# Patient Record
Sex: FEMALE | Race: OTHER | HISPANIC OR LATINO
[De-identification: names, ages, dates, MRNs, and addresses within clinical notes are randomized per-mention and may not be internally consistent; named-entity substitution may affect disease eponyms.]

---

## 2017-01-03 ENCOUNTER — APPOINTMENT (OUTPATIENT)
Dept: INTERNAL MEDICINE | Facility: CLINIC | Age: 42
End: 2017-01-03

## 2017-01-03 VITALS
TEMPERATURE: 98.4 F | HEIGHT: 61 IN | WEIGHT: 123 LBS | DIASTOLIC BLOOD PRESSURE: 70 MMHG | BODY MASS INDEX: 23.22 KG/M2 | SYSTOLIC BLOOD PRESSURE: 122 MMHG

## 2017-01-09 ENCOUNTER — APPOINTMENT (OUTPATIENT)
Dept: SURGERY | Facility: CLINIC | Age: 42
End: 2017-01-09

## 2017-01-09 VITALS
DIASTOLIC BLOOD PRESSURE: 90 MMHG | SYSTOLIC BLOOD PRESSURE: 153 MMHG | BODY MASS INDEX: 24.15 KG/M2 | HEIGHT: 60 IN | WEIGHT: 123 LBS | RESPIRATION RATE: 18 BRPM | HEART RATE: 73 BPM | TEMPERATURE: 99.1 F

## 2017-01-19 ENCOUNTER — APPOINTMENT (OUTPATIENT)
Dept: GASTROENTEROLOGY | Facility: CLINIC | Age: 42
End: 2017-01-19

## 2017-02-07 ENCOUNTER — OUTPATIENT (OUTPATIENT)
Dept: OUTPATIENT SERVICES | Facility: HOSPITAL | Age: 42
LOS: 1 days | End: 2017-02-07
Payer: COMMERCIAL

## 2017-02-07 VITALS
DIASTOLIC BLOOD PRESSURE: 78 MMHG | OXYGEN SATURATION: 100 % | HEART RATE: 62 BPM | RESPIRATION RATE: 16 BRPM | WEIGHT: 125.66 LBS | HEIGHT: 61 IN | TEMPERATURE: 98 F | SYSTOLIC BLOOD PRESSURE: 120 MMHG

## 2017-02-07 DIAGNOSIS — Z01.818 ENCOUNTER FOR OTHER PREPROCEDURAL EXAMINATION: ICD-10-CM

## 2017-02-07 DIAGNOSIS — Z98.51 TUBAL LIGATION STATUS: Chronic | ICD-10-CM

## 2017-02-07 DIAGNOSIS — K80.20 CALCULUS OF GALLBLADDER WITHOUT CHOLECYSTITIS WITHOUT OBSTRUCTION: ICD-10-CM

## 2017-02-07 LAB
HCT VFR BLD CALC: 39.7 % — SIGNIFICANT CHANGE UP (ref 34.5–45)
HGB BLD-MCNC: 13 G/DL — SIGNIFICANT CHANGE UP (ref 11.5–15.5)
MCHC RBC-ENTMCNC: 31.5 PG — SIGNIFICANT CHANGE UP (ref 27–34)
MCHC RBC-ENTMCNC: 32.7 GM/DL — SIGNIFICANT CHANGE UP (ref 32–36)
MCV RBC AUTO: 96.1 FL — SIGNIFICANT CHANGE UP (ref 80–100)
PLATELET # BLD AUTO: 235 K/UL — SIGNIFICANT CHANGE UP (ref 150–400)
RBC # BLD: 4.13 M/UL — SIGNIFICANT CHANGE UP (ref 3.8–5.2)
RBC # FLD: 12.7 % — SIGNIFICANT CHANGE UP (ref 10.3–14.5)
WBC # BLD: 4.64 K/UL — SIGNIFICANT CHANGE UP (ref 3.8–10.5)
WBC # FLD AUTO: 4.64 K/UL — SIGNIFICANT CHANGE UP (ref 3.8–10.5)

## 2017-02-07 PROCEDURE — 80053 COMPREHEN METABOLIC PANEL: CPT

## 2017-02-07 PROCEDURE — 85027 COMPLETE CBC AUTOMATED: CPT

## 2017-02-07 PROCEDURE — G0463: CPT

## 2017-02-07 PROCEDURE — 36415 COLL VENOUS BLD VENIPUNCTURE: CPT

## 2017-02-07 NOTE — H&P PST ADULT - MALLAMPATI CLASS
Class II - visualization of the soft palate, fauces, and uvula Class III - visualization of the soft palate and the base of the uvula

## 2017-02-07 NOTE — H&P PST ADULT - HISTORY OF PRESENT ILLNESS
40 yo  female, reports GI symptoms since summer 2016, with mid-epigastric pain and RUQ discomfort, radiating to mid back, U/S revealed a 5cm gallstone. Pt. had endoscopy done recently which revealed H-pylori which she was treated for. Pt. presents to PST today for Laparoscopic Cholecystectomy on 2/15/17. Pt. denies recent fever, chills, SOB, chest pain, changes in bowel/urinary habits.

## 2017-02-07 NOTE — H&P PST ADULT - PROBLEM SELECTOR PLAN 1
Laparoscopic Cholecystectomy  Pre-op education, including Chlorhexidine soap, provided - all questions answered

## 2017-02-07 NOTE — H&P PST ADULT - NSANTHOSAYNRD_GEN_A_CORE
No. SCOTTY screening performed.  STOP BANG Legend: 0-2 = LOW Risk; 3-4 = INTERMEDIATE Risk; 5-8 = HIGH Risk

## 2017-02-08 LAB
ALBUMIN SERPL ELPH-MCNC: 4.5 G/DL — SIGNIFICANT CHANGE UP (ref 3.3–5)
ALP SERPL-CCNC: 41 U/L — SIGNIFICANT CHANGE UP (ref 40–120)
ALT FLD-CCNC: 17 U/L — SIGNIFICANT CHANGE UP (ref 10–45)
ANION GAP SERPL CALC-SCNC: 15 MMOL/L — SIGNIFICANT CHANGE UP (ref 5–17)
AST SERPL-CCNC: 19 U/L — SIGNIFICANT CHANGE UP (ref 10–40)
BILIRUB SERPL-MCNC: 0.3 MG/DL — SIGNIFICANT CHANGE UP (ref 0.2–1.2)
BUN SERPL-MCNC: 11 MG/DL — SIGNIFICANT CHANGE UP (ref 7–23)
CALCIUM SERPL-MCNC: 9.1 MG/DL — SIGNIFICANT CHANGE UP (ref 8.4–10.5)
CHLORIDE SERPL-SCNC: 103 MMOL/L — SIGNIFICANT CHANGE UP (ref 96–108)
CO2 SERPL-SCNC: 23 MMOL/L — SIGNIFICANT CHANGE UP (ref 22–31)
CREAT SERPL-MCNC: 0.55 MG/DL — SIGNIFICANT CHANGE UP (ref 0.5–1.3)
GLUCOSE SERPL-MCNC: 70 MG/DL — SIGNIFICANT CHANGE UP (ref 70–99)
POTASSIUM SERPL-MCNC: 3.7 MMOL/L — SIGNIFICANT CHANGE UP (ref 3.5–5.3)
POTASSIUM SERPL-SCNC: 3.7 MMOL/L — SIGNIFICANT CHANGE UP (ref 3.5–5.3)
PROT SERPL-MCNC: 7.2 G/DL — SIGNIFICANT CHANGE UP (ref 6–8.3)
SODIUM SERPL-SCNC: 141 MMOL/L — SIGNIFICANT CHANGE UP (ref 135–145)

## 2017-02-10 ENCOUNTER — APPOINTMENT (OUTPATIENT)
Dept: INTERNAL MEDICINE | Facility: CLINIC | Age: 42
End: 2017-02-10

## 2017-02-10 ENCOUNTER — OTHER (OUTPATIENT)
Age: 42
End: 2017-02-10

## 2017-02-13 ENCOUNTER — APPOINTMENT (OUTPATIENT)
Dept: INTERNAL MEDICINE | Facility: CLINIC | Age: 42
End: 2017-02-13

## 2017-02-14 ENCOUNTER — RESULT REVIEW (OUTPATIENT)
Age: 42
End: 2017-02-14

## 2017-02-14 LAB — UREA BREATH TEST QL: NEGATIVE

## 2017-02-15 ENCOUNTER — TRANSCRIPTION ENCOUNTER (OUTPATIENT)
Age: 42
End: 2017-02-15

## 2017-02-15 ENCOUNTER — INPATIENT (INPATIENT)
Facility: HOSPITAL | Age: 42
LOS: 0 days | Discharge: HOME | DRG: 419 | End: 2017-02-15
Attending: SURGERY | Admitting: SURGERY
Payer: COMMERCIAL

## 2017-02-15 ENCOUNTER — APPOINTMENT (OUTPATIENT)
Dept: SURGERY | Facility: HOSPITAL | Age: 42
End: 2017-02-15

## 2017-02-15 VITALS
HEART RATE: 75 BPM | SYSTOLIC BLOOD PRESSURE: 114 MMHG | DIASTOLIC BLOOD PRESSURE: 71 MMHG | RESPIRATION RATE: 16 BRPM | TEMPERATURE: 98 F | OXYGEN SATURATION: 100 %

## 2017-02-15 VITALS
OXYGEN SATURATION: 100 % | WEIGHT: 125 LBS | HEART RATE: 64 BPM | DIASTOLIC BLOOD PRESSURE: 66 MMHG | TEMPERATURE: 98 F | RESPIRATION RATE: 18 BRPM | SYSTOLIC BLOOD PRESSURE: 101 MMHG | HEIGHT: 61 IN

## 2017-02-15 DIAGNOSIS — Z98.51 TUBAL LIGATION STATUS: Chronic | ICD-10-CM

## 2017-02-15 DIAGNOSIS — K80.20 CALCULUS OF GALLBLADDER WITHOUT CHOLECYSTITIS WITHOUT OBSTRUCTION: ICD-10-CM

## 2017-02-15 DIAGNOSIS — D17.5 BENIGN LIPOMATOUS NEOPLASM OF INTRA-ABDOMINAL ORGANS: ICD-10-CM

## 2017-02-15 PROCEDURE — 88313 SPECIAL STAINS GROUP 2: CPT

## 2017-02-15 PROCEDURE — 88304 TISSUE EXAM BY PATHOLOGIST: CPT

## 2017-02-15 PROCEDURE — 88313 SPECIAL STAINS GROUP 2: CPT | Mod: 26

## 2017-02-15 PROCEDURE — 88304 TISSUE EXAM BY PATHOLOGIST: CPT | Mod: 26

## 2017-02-15 RX ORDER — HYDROMORPHONE HYDROCHLORIDE 2 MG/ML
0.25 INJECTION INTRAMUSCULAR; INTRAVENOUS; SUBCUTANEOUS
Qty: 0 | Refills: 0 | Status: DISCONTINUED | OUTPATIENT
Start: 2017-02-15 | End: 2017-02-15

## 2017-02-15 RX ORDER — SODIUM CHLORIDE 9 MG/ML
3 INJECTION INTRAMUSCULAR; INTRAVENOUS; SUBCUTANEOUS EVERY 8 HOURS
Qty: 0 | Refills: 0 | Status: DISCONTINUED | OUTPATIENT
Start: 2017-02-15 | End: 2017-02-15

## 2017-02-15 RX ORDER — HYDROMORPHONE HYDROCHLORIDE 2 MG/ML
0.5 INJECTION INTRAMUSCULAR; INTRAVENOUS; SUBCUTANEOUS
Qty: 0 | Refills: 0 | Status: DISCONTINUED | OUTPATIENT
Start: 2017-02-15 | End: 2017-02-15

## 2017-02-15 RX ORDER — ACETAMINOPHEN 500 MG
1000 TABLET ORAL ONCE
Qty: 0 | Refills: 0 | Status: COMPLETED | OUTPATIENT
Start: 2017-02-15 | End: 2017-02-15

## 2017-02-15 RX ORDER — SODIUM CHLORIDE 9 MG/ML
500 INJECTION, SOLUTION INTRAVENOUS
Qty: 0 | Refills: 0 | Status: DISCONTINUED | OUTPATIENT
Start: 2017-02-15 | End: 2017-02-15

## 2017-02-15 RX ORDER — CEFOTETAN DISODIUM 1 G
2 VIAL (EA) INJECTION ONCE
Qty: 0 | Refills: 0 | Status: DISCONTINUED | OUTPATIENT
Start: 2017-02-15 | End: 2017-02-15

## 2017-02-15 RX ORDER — KETOROLAC TROMETHAMINE 30 MG/ML
30 SYRINGE (ML) INJECTION ONCE
Qty: 0 | Refills: 0 | Status: DISCONTINUED | OUTPATIENT
Start: 2017-02-15 | End: 2017-02-15

## 2017-02-15 RX ORDER — OXYCODONE HYDROCHLORIDE 5 MG/1
5 TABLET ORAL EVERY 4 HOURS
Qty: 0 | Refills: 0 | Status: DISCONTINUED | OUTPATIENT
Start: 2017-02-15 | End: 2017-02-15

## 2017-02-15 RX ORDER — ONDANSETRON 8 MG/1
8 TABLET, FILM COATED ORAL ONCE
Qty: 0 | Refills: 0 | Status: COMPLETED | OUTPATIENT
Start: 2017-02-15 | End: 2017-02-15

## 2017-02-15 RX ORDER — INFLUENZA VIRUS VACCINE 15; 15; 15; 15 UG/.5ML; UG/.5ML; UG/.5ML; UG/.5ML
0.5 SUSPENSION INTRAMUSCULAR ONCE
Qty: 0 | Refills: 0 | Status: DISCONTINUED | OUTPATIENT
Start: 2017-02-15 | End: 2017-02-15

## 2017-02-15 RX ORDER — OMEPRAZOLE 20 MG/1
20 CAPSULE, DELAYED RELEASE ORAL TWICE DAILY
Qty: 28 | Refills: 0 | Status: DISCONTINUED | COMMUNITY
Start: 2017-01-03 | End: 2017-02-15

## 2017-02-15 RX ORDER — CLARITHROMYCIN 500 MG/1
500 TABLET, FILM COATED ORAL
Qty: 28 | Refills: 0 | Status: DISCONTINUED | COMMUNITY
Start: 2017-01-03 | End: 2017-02-15

## 2017-02-15 RX ORDER — OXYCODONE HYDROCHLORIDE 5 MG/1
1 TABLET ORAL
Qty: 24 | Refills: 0 | OUTPATIENT
Start: 2017-02-15 | End: 2017-02-19

## 2017-02-15 RX ORDER — AMOXICILLIN 500 MG/1
500 TABLET, FILM COATED ORAL
Qty: 56 | Refills: 0 | Status: DISCONTINUED | COMMUNITY
Start: 2017-01-03 | End: 2017-02-15

## 2017-02-15 RX ADMIN — SODIUM CHLORIDE 3 MILLILITER(S): 9 INJECTION INTRAMUSCULAR; INTRAVENOUS; SUBCUTANEOUS at 07:32

## 2017-02-15 RX ADMIN — Medication 1000 MILLIGRAM(S): at 10:30

## 2017-02-15 RX ADMIN — ONDANSETRON 8 MILLIGRAM(S): 8 TABLET, FILM COATED ORAL at 12:21

## 2017-02-15 RX ADMIN — Medication 400 MILLIGRAM(S): at 10:16

## 2017-02-15 RX ADMIN — SODIUM CHLORIDE 40 MILLILITER(S): 9 INJECTION, SOLUTION INTRAVENOUS at 10:17

## 2017-02-15 NOTE — BRIEF OPERATIVE NOTE - PRE-OP DX
Calculus of gallbladder with cholecystitis without biliary obstruction, unspecified cholecystitis acuity  02/15/2017    Active  Carlos Vitale

## 2017-02-15 NOTE — ASU DISCHARGE PLAN (ADULT/PEDIATRIC). - NOTIFY
Pain not relieved by Medications/Bleeding that does not stop/Fever greater than 101/Persistent Nausea and Vomiting/Swelling that continues

## 2017-02-15 NOTE — ASU DISCHARGE PLAN (ADULT/PEDIATRIC). - MEDICATION SUMMARY - MEDICATIONS TO TAKE
I will START or STAY ON the medications listed below when I get home from the hospital:    Oxaydo 5 mg oral tablet  -- 1 tab(s) by mouth every 4 hours -for severe pain MDD:6  -- Caution federal law prohibits the transfer of this drug to any person other  than the person for whom it was prescribed.  It is very important that you take or use this exactly as directed.  Do not skip doses or discontinue unless directed by your doctor.  May cause drowsiness.  Alcohol may intensify this effect.  Use care when operating dangerous machinery.  This prescription cannot be refilled.  Using more of this medication than prescribed may cause serious breathing problems.    -- Indication: For Severe pain as needed. Take tylenol or ibuprofen for mild pain as needed

## 2017-02-23 ENCOUNTER — FORM ENCOUNTER (OUTPATIENT)
Age: 42
End: 2017-02-23

## 2017-02-24 ENCOUNTER — OUTPATIENT (OUTPATIENT)
Dept: OUTPATIENT SERVICES | Facility: HOSPITAL | Age: 42
LOS: 1 days | End: 2017-02-24
Payer: COMMERCIAL

## 2017-02-24 ENCOUNTER — APPOINTMENT (OUTPATIENT)
Dept: SURGERY | Facility: CLINIC | Age: 42
End: 2017-02-24

## 2017-02-24 ENCOUNTER — APPOINTMENT (OUTPATIENT)
Dept: ULTRASOUND IMAGING | Facility: CLINIC | Age: 42
End: 2017-02-24

## 2017-02-24 VITALS
OXYGEN SATURATION: 100 % | WEIGHT: 123 LBS | HEIGHT: 60 IN | TEMPERATURE: 98.1 F | DIASTOLIC BLOOD PRESSURE: 82 MMHG | SYSTOLIC BLOOD PRESSURE: 129 MMHG | HEART RATE: 74 BPM | BODY MASS INDEX: 24.15 KG/M2

## 2017-02-24 DIAGNOSIS — Z00.8 ENCOUNTER FOR OTHER GENERAL EXAMINATION: ICD-10-CM

## 2017-02-24 DIAGNOSIS — Z98.51 TUBAL LIGATION STATUS: Chronic | ICD-10-CM

## 2017-02-24 DIAGNOSIS — R79.89 OTHER SPECIFIED ABNORMAL FINDINGS OF BLOOD CHEMISTRY: ICD-10-CM

## 2017-02-24 LAB — SURGICAL PATHOLOGY STUDY: SIGNIFICANT CHANGE UP

## 2017-02-24 PROCEDURE — 76700 US EXAM ABDOM COMPLETE: CPT

## 2017-02-27 LAB
ALBUMIN SERPL ELPH-MCNC: 4.3 G/DL
ALP BLD-CCNC: 53 U/L
ALT SERPL-CCNC: 42 U/L
AMYLASE/CREAT SERPL: 51 U/L
ANION GAP SERPL CALC-SCNC: 14 MMOL/L
AST SERPL-CCNC: 20 U/L
BASOPHILS # BLD AUTO: 0.03 K/UL
BASOPHILS NFR BLD AUTO: 0.8 %
BILIRUB SERPL-MCNC: 0.3 MG/DL
BUN SERPL-MCNC: 12 MG/DL
CALCIUM SERPL-MCNC: 9.1 MG/DL
CHLORIDE SERPL-SCNC: 105 MMOL/L
CO2 SERPL-SCNC: 24 MMOL/L
CREAT SERPL-MCNC: 0.56 MG/DL
EOSINOPHIL # BLD AUTO: 0.15 K/UL
EOSINOPHIL NFR BLD AUTO: 3.8 %
GLUCOSE SERPL-MCNC: 77 MG/DL
HCT VFR BLD CALC: 38.4 %
HGB BLD-MCNC: 12.5 G/DL
IMM GRANULOCYTES NFR BLD AUTO: 0.3 %
LPL SERPL-CCNC: 31 U/L
LYMPHOCYTES # BLD AUTO: 1.6 K/UL
LYMPHOCYTES NFR BLD AUTO: 40.4 %
MAN DIFF?: NORMAL
MCHC RBC-ENTMCNC: 31.6 PG
MCHC RBC-ENTMCNC: 32.6 GM/DL
MCV RBC AUTO: 97.2 FL
MONOCYTES # BLD AUTO: 0.4 K/UL
MONOCYTES NFR BLD AUTO: 10.1 %
NEUTROPHILS # BLD AUTO: 1.77 K/UL
NEUTROPHILS NFR BLD AUTO: 44.6 %
PLATELET # BLD AUTO: 232 K/UL
POTASSIUM SERPL-SCNC: 4.1 MMOL/L
PROT SERPL-MCNC: 6.7 G/DL
RBC # BLD: 3.95 M/UL
RBC # FLD: 12.6 %
SODIUM SERPL-SCNC: 143 MMOL/L
WBC # FLD AUTO: 3.96 K/UL

## 2017-02-28 ENCOUNTER — APPOINTMENT (OUTPATIENT)
Dept: INTERNAL MEDICINE | Facility: CLINIC | Age: 42
End: 2017-02-28

## 2017-02-28 VITALS
WEIGHT: 124 LBS | DIASTOLIC BLOOD PRESSURE: 64 MMHG | BODY MASS INDEX: 24.35 KG/M2 | HEIGHT: 60 IN | HEART RATE: 76 BPM | SYSTOLIC BLOOD PRESSURE: 120 MMHG

## 2017-02-28 DIAGNOSIS — M62.830 MUSCLE SPASM OF BACK: ICD-10-CM

## 2017-03-27 ENCOUNTER — APPOINTMENT (OUTPATIENT)
Dept: SURGERY | Facility: CLINIC | Age: 42
End: 2017-03-27

## 2017-03-27 VITALS
OXYGEN SATURATION: 100 % | TEMPERATURE: 98.2 F | RESPIRATION RATE: 16 BRPM | HEART RATE: 91 BPM | DIASTOLIC BLOOD PRESSURE: 89 MMHG | SYSTOLIC BLOOD PRESSURE: 132 MMHG

## 2017-03-27 DIAGNOSIS — Z90.49 ACQUIRED ABSENCE OF OTHER SPECIFIED PARTS OF DIGESTIVE TRACT: ICD-10-CM

## 2017-03-27 DIAGNOSIS — R10.13 EPIGASTRIC PAIN: ICD-10-CM

## 2017-03-27 RX ORDER — OMEPRAZOLE 40 MG/1
40 CAPSULE, DELAYED RELEASE ORAL TWICE DAILY
Qty: 60 | Refills: 0 | Status: DISCONTINUED | COMMUNITY
Start: 2017-02-24 | End: 2017-03-27

## 2017-03-27 RX ORDER — CYCLOBENZAPRINE HYDROCHLORIDE 5 MG/1
5 TABLET, FILM COATED ORAL
Qty: 14 | Refills: 0 | Status: DISCONTINUED | COMMUNITY
Start: 2017-02-28 | End: 2017-03-27

## 2017-04-17 ENCOUNTER — APPOINTMENT (OUTPATIENT)
Dept: MRI IMAGING | Facility: CLINIC | Age: 42
End: 2017-04-17

## 2017-04-24 ENCOUNTER — APPOINTMENT (OUTPATIENT)
Dept: SURGERY | Facility: CLINIC | Age: 42
End: 2017-04-24

## 2017-07-06 ENCOUNTER — APPOINTMENT (OUTPATIENT)
Dept: INTERNAL MEDICINE | Facility: CLINIC | Age: 42
End: 2017-07-06

## 2017-07-06 VITALS
SYSTOLIC BLOOD PRESSURE: 118 MMHG | HEART RATE: 62 BPM | OXYGEN SATURATION: 97 % | DIASTOLIC BLOOD PRESSURE: 78 MMHG | TEMPERATURE: 97.7 F | WEIGHT: 130 LBS | HEIGHT: 60 IN | BODY MASS INDEX: 25.52 KG/M2

## 2017-07-06 DIAGNOSIS — R42 DIZZINESS AND GIDDINESS: ICD-10-CM

## 2017-07-09 LAB
ANION GAP SERPL CALC-SCNC: 13 MMOL/L
BUN SERPL-MCNC: 14 MG/DL
CALCIUM SERPL-MCNC: 8.7 MG/DL
CHLORIDE SERPL-SCNC: 105 MMOL/L
CO2 SERPL-SCNC: 25 MMOL/L
CREAT SERPL-MCNC: 0.71 MG/DL
GLUCOSE SERPL-MCNC: 74 MG/DL
HCT VFR BLD CALC: 38.7 %
HGB BLD-MCNC: 12.4 G/DL
POTASSIUM SERPL-SCNC: 4 MMOL/L
SODIUM SERPL-SCNC: 143 MMOL/L
TSH SERPL-ACNC: 1.86 UIU/ML

## 2017-09-05 ENCOUNTER — APPOINTMENT (OUTPATIENT)
Dept: INTERNAL MEDICINE | Facility: CLINIC | Age: 42
End: 2017-09-05
Payer: COMMERCIAL

## 2017-09-05 ENCOUNTER — LABORATORY RESULT (OUTPATIENT)
Age: 42
End: 2017-09-05

## 2017-09-05 VITALS
DIASTOLIC BLOOD PRESSURE: 60 MMHG | SYSTOLIC BLOOD PRESSURE: 110 MMHG | WEIGHT: 127 LBS | TEMPERATURE: 97.8 F | HEIGHT: 60 IN | BODY MASS INDEX: 24.94 KG/M2

## 2017-09-05 PROCEDURE — 36415 COLL VENOUS BLD VENIPUNCTURE: CPT

## 2017-09-05 PROCEDURE — 99213 OFFICE O/P EST LOW 20 MIN: CPT | Mod: 25

## 2017-09-05 RX ORDER — BISMUTH SUBSALICYLATE 525 MG/1
TABLET ORAL
Qty: 1 | Refills: 1 | Status: ACTIVE | COMMUNITY
Start: 2017-09-05 | End: 1900-01-01

## 2017-09-08 LAB
ALBUMIN SERPL ELPH-MCNC: 4.3 G/DL
ALP BLD-CCNC: 48 U/L
ALT SERPL-CCNC: 16 U/L
AMYLASE/CREAT SERPL: 49 U/L
ANION GAP SERPL CALC-SCNC: 14 MMOL/L
AST SERPL-CCNC: 21 U/L
BASOPHILS # BLD AUTO: 0.03 K/UL
BASOPHILS NFR BLD AUTO: 0.7 %
BILIRUB SERPL-MCNC: 0.4 MG/DL
BUN SERPL-MCNC: 12 MG/DL
CALCIUM SERPL-MCNC: 9.2 MG/DL
CHLORIDE SERPL-SCNC: 104 MMOL/L
CO2 SERPL-SCNC: 24 MMOL/L
CREAT SERPL-MCNC: 0.62 MG/DL
ENDOMYSIUM IGA SER QL: NORMAL
ENDOMYSIUM IGA TITR SER: NORMAL
EOSINOPHIL # BLD AUTO: 0.1 K/UL
EOSINOPHIL NFR BLD AUTO: 2.2 %
GLIADIN IGA SER QL: <5 UNITS
GLIADIN IGG SER QL: <5 UNITS
GLIADIN PEPTIDE IGA SER-ACNC: NEGATIVE
GLIADIN PEPTIDE IGG SER-ACNC: NEGATIVE
GLUCOSE SERPL-MCNC: 86 MG/DL
HCT VFR BLD CALC: 40.4 %
HGB BLD-MCNC: 12.9 G/DL
IGA SER QL IEP: 201 MG/DL
IMM GRANULOCYTES NFR BLD AUTO: 0.2 %
LPL SERPL-CCNC: 22 U/L
LYMPHOCYTES # BLD AUTO: 1.74 K/UL
LYMPHOCYTES NFR BLD AUTO: 38.5 %
MAN DIFF?: NORMAL
MCHC RBC-ENTMCNC: 31 PG
MCHC RBC-ENTMCNC: 31.9 GM/DL
MCV RBC AUTO: 97.1 FL
MONOCYTES # BLD AUTO: 0.45 K/UL
MONOCYTES NFR BLD AUTO: 10 %
NEUTROPHILS # BLD AUTO: 2.19 K/UL
NEUTROPHILS NFR BLD AUTO: 48.4 %
PLATELET # BLD AUTO: 234 K/UL
POTASSIUM SERPL-SCNC: 4.2 MMOL/L
PROT SERPL-MCNC: 7.9 G/DL
RBC # BLD: 4.16 M/UL
RBC # FLD: 13.2 %
SODIUM SERPL-SCNC: 142 MMOL/L
T4 FREE SERPL-MCNC: 1.3 NG/DL
TSH SERPL-ACNC: 1.61 UIU/ML
TTG IGA SER IA-ACNC: <5 UNITS
TTG IGA SER-ACNC: NEGATIVE
TTG IGG SER IA-ACNC: <5 UNITS
TTG IGG SER IA-ACNC: NEGATIVE
WBC # FLD AUTO: 4.52 K/UL

## 2017-10-05 ENCOUNTER — APPOINTMENT (OUTPATIENT)
Dept: INTERNAL MEDICINE | Facility: CLINIC | Age: 42
End: 2017-10-05
Payer: COMMERCIAL

## 2017-10-05 VITALS
BODY MASS INDEX: 24.74 KG/M2 | HEIGHT: 60 IN | TEMPERATURE: 97.7 F | DIASTOLIC BLOOD PRESSURE: 60 MMHG | SYSTOLIC BLOOD PRESSURE: 90 MMHG | WEIGHT: 126 LBS

## 2017-10-05 PROCEDURE — 99213 OFFICE O/P EST LOW 20 MIN: CPT | Mod: 25

## 2017-10-05 PROCEDURE — 90688 IIV4 VACCINE SPLT 0.5 ML IM: CPT

## 2017-10-05 PROCEDURE — G0008: CPT

## 2017-10-09 ENCOUNTER — APPOINTMENT (OUTPATIENT)
Dept: INTERNAL MEDICINE | Facility: CLINIC | Age: 42
End: 2017-10-09
Payer: COMMERCIAL

## 2017-10-09 PROCEDURE — 91065 BREATH HYDROGEN/METHANE TEST: CPT

## 2017-11-21 ENCOUNTER — APPOINTMENT (OUTPATIENT)
Dept: INTERNAL MEDICINE | Facility: CLINIC | Age: 42
End: 2017-11-21
Payer: COMMERCIAL

## 2017-11-21 VITALS
DIASTOLIC BLOOD PRESSURE: 70 MMHG | WEIGHT: 124 LBS | HEART RATE: 65 BPM | OXYGEN SATURATION: 98 % | SYSTOLIC BLOOD PRESSURE: 100 MMHG | BODY MASS INDEX: 24.22 KG/M2

## 2017-11-21 PROCEDURE — 99396 PREV VISIT EST AGE 40-64: CPT

## 2017-12-21 ENCOUNTER — APPOINTMENT (OUTPATIENT)
Dept: INTERNAL MEDICINE | Facility: CLINIC | Age: 42
End: 2017-12-21

## 2018-12-31 ENCOUNTER — APPOINTMENT (OUTPATIENT)
Dept: INTERNAL MEDICINE | Facility: CLINIC | Age: 43
End: 2018-12-31
Payer: COMMERCIAL

## 2018-12-31 VITALS
DIASTOLIC BLOOD PRESSURE: 64 MMHG | BODY MASS INDEX: 24.54 KG/M2 | HEIGHT: 60 IN | SYSTOLIC BLOOD PRESSURE: 102 MMHG | WEIGHT: 125 LBS | TEMPERATURE: 98.7 F

## 2018-12-31 PROBLEM — K80.20 CALCULUS OF GALLBLADDER WITHOUT CHOLECYSTITIS WITHOUT OBSTRUCTION: Chronic | Status: ACTIVE | Noted: 2017-02-07

## 2018-12-31 PROCEDURE — 99214 OFFICE O/P EST MOD 30 MIN: CPT

## 2018-12-31 RX ORDER — TOBRAMYCIN AND DEXAMETHASONE 3; 1 MG/ML; MG/ML
0.3-0.1 SUSPENSION/ DROPS OPHTHALMIC
Qty: 5 | Refills: 0 | Status: DISCONTINUED | COMMUNITY
Start: 2018-10-09

## 2018-12-31 NOTE — REVIEW OF SYSTEMS
[Fever] : no fever [Earache] : earache [Nasal Discharge] : no nasal discharge [Sore Throat] : no sore throat [Negative] : Eyes

## 2018-12-31 NOTE — PHYSICAL EXAM
[No Acute Distress] : no acute distress [Well Nourished] : well nourished [Well Developed] : well developed [Well-Appearing] : well-appearing [Normal Sclera/Conjunctiva] : normal sclera/conjunctiva [EOMI] : extraocular movements intact [Normal Outer Ear/Nose] : the outer ears and nose were normal in appearance [Normal Oropharynx] : the oropharynx was normal [Normal TMs] : both tympanic membranes were normal [Supple] : supple [de-identified] : mild [pain around the tragus anteriorly and posteriorly.  Pain reaches to the angle of the jaw on the right.

## 2018-12-31 NOTE — HISTORY OF PRESENT ILLNESS
[FreeTextEntry8] : 44 yo female here with c/o 3 days of stuffiness, 1 day of right sharp ear pain, like poking a knife in it.  No ear discharge that she has noticed.  Her little one has a cold.  Did not take anything for this pain.  Feels swollen glands, especially the back of the neck.  The pain is an 8/10 when it occurs but it is intermittent in nature.  Opening and closing her mouth makes the pain worse.  When asked about grinding she said her dentist asked her the same question.

## 2018-12-31 NOTE — ASSESSMENT
[FreeTextEntry1] : 43-year-old female with 1 day of intermittent, sharp right TMJ pain likely secondary to grinding as her dentist has also noted some changes that go along with this.  Lower on the differential is mastoiditis or mastoid inflammation.  There is no evidence of any ear infection on the right nor is there any tender lymphadenopathy.  Her posterior pharynx was unremarkable.  Her pain extends from the front of the ear down inferiorly and wrapping around to the back in the TMJ location.  Recommend warm soaks, NSAIDs.  If worsens, will call and will reevaluate signs and symptoms at that time.

## 2019-01-08 ENCOUNTER — APPOINTMENT (OUTPATIENT)
Dept: INTERNAL MEDICINE | Facility: CLINIC | Age: 44
End: 2019-01-08
Payer: COMMERCIAL

## 2019-01-08 VITALS
TEMPERATURE: 96.8 F | SYSTOLIC BLOOD PRESSURE: 110 MMHG | BODY MASS INDEX: 23.6 KG/M2 | WEIGHT: 125 LBS | DIASTOLIC BLOOD PRESSURE: 60 MMHG | HEIGHT: 61 IN

## 2019-01-08 PROCEDURE — 99214 OFFICE O/P EST MOD 30 MIN: CPT

## 2019-01-21 ENCOUNTER — APPOINTMENT (OUTPATIENT)
Dept: INTERNAL MEDICINE | Facility: CLINIC | Age: 44
End: 2019-01-21

## 2019-01-30 ENCOUNTER — APPOINTMENT (OUTPATIENT)
Dept: INTERNAL MEDICINE | Facility: CLINIC | Age: 44
End: 2019-01-30
Payer: COMMERCIAL

## 2019-01-30 VITALS
HEART RATE: 67 BPM | SYSTOLIC BLOOD PRESSURE: 96 MMHG | WEIGHT: 125 LBS | BODY MASS INDEX: 24.22 KG/M2 | DIASTOLIC BLOOD PRESSURE: 60 MMHG | OXYGEN SATURATION: 97 % | HEIGHT: 60.25 IN

## 2019-01-30 DIAGNOSIS — R10.13 EPIGASTRIC PAIN: ICD-10-CM

## 2019-01-30 DIAGNOSIS — K12.1 OTHER FORMS OF STOMATITIS: ICD-10-CM

## 2019-01-30 DIAGNOSIS — R76.11 NONSPECIFIC REACTION TO TUBERCULIN SKIN TEST W/OUT ACTIVE TUBERCULOSIS: ICD-10-CM

## 2019-01-30 DIAGNOSIS — M26.621 ARTHRALGIA OF RIGHT TEMPOROMANDIBULAR JOINT: ICD-10-CM

## 2019-01-30 PROCEDURE — 99396 PREV VISIT EST AGE 40-64: CPT

## 2019-01-30 PROCEDURE — G0442 ANNUAL ALCOHOL SCREEN 15 MIN: CPT

## 2019-01-30 PROCEDURE — G0444 DEPRESSION SCREEN ANNUAL: CPT

## 2019-01-30 RX ORDER — CHOLESTYRAMINE 4 G/9G
4 POWDER, FOR SUSPENSION ORAL
Qty: 60 | Refills: 3 | Status: DISCONTINUED | COMMUNITY
Start: 2017-10-05 | End: 2019-01-30

## 2019-01-30 NOTE — HEALTH RISK ASSESSMENT
[Good] : ~his/her~  mood as  good [] : No [No falls in past year] : Patient reported no falls in the past year [de-identified] : Occasional [Patient reported PAP Smear was normal] : Patient reported PAP Smear was normal [Change in mental status noted] : No change in mental status noted [Language] : denies difficulty with language [Behavior] : denies difficulty with behavior [Learning/Retaining New Information] : denies difficulty learning/retaining new information [Handling Complex Tasks] : denies difficulty handling complex tasks [Reasoning] : denies difficulty with reasoning [Spatial Ability and Orientation] : denies difficulty with spatial ability and orientation [Fully functional (bathing, dressing, toileting, transferring, walking, feeding)] : Fully functional (bathing, dressing, toileting, transferring, walking, feeding) [Fully functional (using the telephone, shopping, preparing meals, housekeeping, doing laundry, using] : Fully functional and needs no help or supervision to perform IADLs (using the telephone, shopping, preparing meals, housekeeping, doing laundry, using transportation, managing medications and managing finances) [Reports changes in hearing] : Reports no changes in hearing [Reports changes in vision] : Reports no changes in vision [Reports changes in dental health] : Reports no changes in dental health [MammogramDate] : 01/19 [PapSmearDate] : 04/15

## 2019-01-30 NOTE — DISCUSSION/SUMMARY
[FreeTextEntry1] : \par 1.  OCD -not an issue today no longer on meds.  She will always have to work on her tendencies but she feels that it remains controlled\par 2.  Insomnia - long standing.  She has no trouble initiating sleep at this point but rather maintaining it.  GYN feels it may be related to perimenopause at this point.\par 3.  Nipple lesion - long-standing and c/w a cyst.   She can express cheesy-smelling white discharge from it.  \par 4.  HCM - see below\par 5.  H/O latent TB - no constitutional signs or symptoms\par 6.  Epigastric discomfort and sensation of dysphasia along with bloating since her cholecystectomy - low residue diet, avoid carbonated beverages, decrease caffeine intake.  Has EGD scheduled for next month.\par 7.  Declines a flu shot today\par 8.  To try and get more information regarding the reason for her mother having a what sounds like partial colectomy.  Her mother passed away due to surgical complications.  Evidently when they stapled the bowel together it leaked.  She said they were blisters that were ready to pop.  Questionable diverticulitis as they do not sound like they would have done surgery for polyps in the setting.  She was asking because she wanted to know when to start screening for colorectal cancer.\par 9.  Labs as per plan\par 10.  Return to office in 1 year or as needed\par 7.  F/U pending above [Health Maintenance Visit] : health maintenance visit [Healthy Adult Female] : healthy adult female [Healthy Diet] : eats a healthy diet [Adequate Exercise Regimen] : has an adequate exercise regimen [Screening Current] : cervical cancer screening is current [Mammogram Every Year] : mammogram is needed every year [Screening Managed by ___] : breast cancer screening is managed by [unfilled] [Screening Not Indicated] : colorectal cancer screening is not indicated [BMD Testing Not Indicated] : bone mineral density testing is not indicated [Hemoglobin] : hemoglobin [Glucose] : glucose [Lipid Profile] : lipid profile [Thyroid Function Testing] : thyroid function testing [25-Hydroxyvitamin D] : 25-hydroxyvitamin D [Immunizations Up to Date] : immunizations are up to date [Dentist] : a dentist [Weight Loss] : weight loss [Sunscreen Use] : sunscreen use [Self Skin Examination] : self skin examination [Seat Belt Use] : seat belt use [Patient] : discussed with the patient

## 2019-01-30 NOTE — PHYSICAL EXAM
[General Appearance - Alert] : alert [General Appearance - In No Acute Distress] : in no acute distress [General Appearance - Well Nourished] : well nourished [General Appearance - Well Developed] : well developed [General Appearance - Well-Appearing] : healthy appearing [Sclera] : the sclera and conjunctiva were normal [PERRL With Normal Accommodation] : pupils were equal in size, round, and reactive to light [Extraocular Movements] : extraocular movements were intact [Optic Disc Abnormality] : the optic disc were normal in size and color [Outer Ear] : the ears and nose were normal in appearance [Both Tympanic Membranes Were Examined] : both tympanic membranes were normal [Oropharynx] : the oropharynx was normal [Neck Appearance] : the appearance of the neck was normal [Neck Cervical Mass (___cm)] : no neck mass was observed [Jugular Venous Distention Increased] : there was no jugular-venous distention [Thyroid Diffuse Enlargement] : the thyroid was not enlarged [Thyroid Nodule] : there were no palpable thyroid nodules [Auscultation Breath Sounds / Voice Sounds] : lungs were clear to auscultation bilaterally [Heart Rate And Rhythm] : heart rate was normal and rhythm regular [Heart Sounds] : normal S1 and S2 [Heart Sounds Gallop] : no gallops [Murmurs] : no murmurs [Heart Sounds Pericardial Friction Rub] : no pericardial rub [Arterial Pulses Carotid] : carotid pulses were normal with no bruits [Full Pulse] : the pedal pulses are present [Edema] : there was no peripheral edema [Breast Appearance] : normal in appearance [Breast Palpation Mass] : no palpable masses [Breast Abnormal Lactation (Galactorrhea)] : no nipple discharge [FreeTextEntry1] : Small cyst left areola that is white and soft - millial in appearance.  Occasionally expressees white material.  Has had for ages.  GYN has told her there is nothing to do about it but she wishes to have it taken care of.   [Bowel Sounds] : normal bowel sounds [Abdomen Soft] : soft [Abdomen Tenderness] : non-tender [Abdomen Mass (___ Cm)] : no abdominal mass palpated [Abdomen Hernia] : no hernia was discovered [Cervical Lymph Nodes Enlarged Posterior Bilaterally] : posterior cervical [Cervical Lymph Nodes Enlarged Anterior Bilaterally] : anterior cervical [Supraclavicular Lymph Nodes Enlarged Bilaterally] : supraclavicular [Axillary Lymph Nodes Enlarged Bilaterally] : axillary [Femoral Lymph Nodes Enlarged Bilaterally] : femoral [Inguinal Lymph Nodes Enlarged Bilaterally] : inguinal [No CVA Tenderness] : no ~M costovertebral angle tenderness [No Spinal Tenderness] : no spinal tenderness [Nail Clubbing] : no clubbing  or cyanosis of the fingernails [Musculoskeletal - Swelling] : no joint swelling seen [Motor Tone] : muscle strength and tone were normal [Skin Turgor] : normal skin turgor [] : no rash [Cranial Nerves] : cranial nerves 2-12 were intact [No Focal Deficits] : no focal deficits [Oriented To Time, Place, And Person] : oriented to person, place, and time [Impaired Insight] : insight and judgment were intact [Affect] : the affect was normal

## 2019-01-30 NOTE — COUNSELING
[Healthy eating counseling provided] : healthy eating [None] : None [Needs reinforcement, provided] : Patient needs reinforcement on understanding lifestyle changes and  the steps needed to achieve self management goals and reinforcement was provided [de-identified] : Antireflux diet discussed, elevate head of bed [ - Annual Alcohol Misuse Screening] : Annual Alcohol Misuse Screening [ - Annual Depression Screening] : Annual Depression Screening

## 2019-01-30 NOTE — HISTORY OF PRESENT ILLNESS
[Health Maintenance] : health maintenance [___ Year(s) Ago] : [unfilled] year(s) ago [] :  [Occupation ___] : occupation: [unfilled] [Never Smoked Cigarettes] : has never smoked cigarettes [Occasional Use] : occasional alcohol use [Never] : has never used illicit drugs [Good] : good [Reg. Dental Visits] : ~He/She~ does not have regular dental visits [Vision Problems] : She denies vision problems [Hearing Loss] : She denies hearing loss [Healthy Diet] : She consumes a diverse and healthy diet [Regular Exercise] : She does not exercise regularly [Tobacco Use] : She does not use tobacco [Perimenopausal] : the patient is perimenopausal [Performed Within 5 Years] : lipid profile performed within the past five years [Performed Last Year] : thyroid function test performed last year [Hypertension] : no hypertension [Diabetes] : no diabetes [High LDL] : no high LDL cholesterol [Previous Breast Cancer] : no previous breast cancer [Abnormal Cervical Cytology] : no abnormal cervical cytology [Inflammatory Bowel Disease] : no inflammatory bowel disease [Osteoporosis Risk Factors] : no osteoporosis risk factors [Seat Belt] : seat belt [Bicycle Helmet] : bicycle helmet [Sunscreen] : sunscreen [Smoke Detector] : smoke detector [Carbon Monoxide Detector] : carbon monoxide detector [Chemical Abuse Screen] : chemical abuse [Depression Risk Screen] : depression symptoms [Psychiatric Risk Assessment] : psychiatric symptoms [Sexual Risk Screen] : sexual behavior [Domestic Abuse Screen] : domestic abuse [Sexual Risk Behavior] : no unsafe sexual behavior [Chemical Abuse Risk] : no chemical abuse [Domestic Violence Risk] : no domestic violence [Guns at Home] : no guns at home [Anxiety Symptoms] : anxiety symptoms [Depression Symptoms] : no depression symptoms [Up to Date] : not up to date [de-identified] : Declines a flu shot today [de-identified] : \bahman Kurtz is a 44 yo female with OCD, anxiety and insomnia who comes in today for a comprehensive visit.   \par \par She has been following with GI recently because of epigastric pain and bloating.  She complains of dysphasia like there is a knot in her throat.  She is scheduled for an endoscopy next month.  She was put on pantoprazole with improvement of her symptoms but continues to have a cup of coffee in the morning.  States this began after eating a lot of spicy food over the holidays.\par Insomnia remains an issue - if she doesn't take benadryl she awakens 2 hours later and can not get back to sleep.  No excessive drowsiness with the benadryl.

## 2019-02-07 LAB
25(OH)D3 SERPL-MCNC: 20.4 NG/ML
ALBUMIN SERPL ELPH-MCNC: 4.5 G/DL
ALP BLD-CCNC: 55 U/L
ALT SERPL-CCNC: 29 U/L
ANION GAP SERPL CALC-SCNC: 11 MMOL/L
AST SERPL-CCNC: 23 U/L
BASOPHILS # BLD AUTO: 0.03 K/UL
BASOPHILS NFR BLD AUTO: 0.8 %
BILIRUB SERPL-MCNC: 0.4 MG/DL
BUN SERPL-MCNC: 18 MG/DL
CALCIUM SERPL-MCNC: 9.1 MG/DL
CHLORIDE SERPL-SCNC: 105 MMOL/L
CHOLEST SERPL-MCNC: 127 MG/DL
CHOLEST/HDLC SERPL: 2.6 RATIO
CO2 SERPL-SCNC: 26 MMOL/L
CREAT SERPL-MCNC: 0.62 MG/DL
EOSINOPHIL # BLD AUTO: 0.08 K/UL
EOSINOPHIL NFR BLD AUTO: 2.1 %
GLUCOSE SERPL-MCNC: 77 MG/DL
HBA1C MFR BLD HPLC: 5.1 %
HCT VFR BLD CALC: 41.4 %
HDLC SERPL-MCNC: 49 MG/DL
HGB BLD-MCNC: 13.2 G/DL
IMM GRANULOCYTES NFR BLD AUTO: 0.3 %
LDLC SERPL CALC-MCNC: 59 MG/DL
LYMPHOCYTES # BLD AUTO: 1.39 K/UL
LYMPHOCYTES NFR BLD AUTO: 36.8 %
MAN DIFF?: NORMAL
MCHC RBC-ENTMCNC: 31.7 PG
MCHC RBC-ENTMCNC: 31.9 GM/DL
MCV RBC AUTO: 99.5 FL
MONOCYTES # BLD AUTO: 0.35 K/UL
MONOCYTES NFR BLD AUTO: 9.3 %
NEUTROPHILS # BLD AUTO: 1.92 K/UL
NEUTROPHILS NFR BLD AUTO: 50.7 %
PLATELET # BLD AUTO: 252 K/UL
POTASSIUM SERPL-SCNC: 4.1 MMOL/L
PROT SERPL-MCNC: 7.2 G/DL
RBC # BLD: 4.16 M/UL
RBC # FLD: 13.1 %
SODIUM SERPL-SCNC: 142 MMOL/L
T4 FREE SERPL-MCNC: 1.2 NG/DL
TRIGL SERPL-MCNC: 97 MG/DL
TSH SERPL-ACNC: 2.52 UIU/ML
WBC # FLD AUTO: 3.78 K/UL

## 2019-02-13 ENCOUNTER — TRANSCRIPTION ENCOUNTER (OUTPATIENT)
Age: 44
End: 2019-02-13

## 2019-02-21 ENCOUNTER — RESULT REVIEW (OUTPATIENT)
Age: 44
End: 2019-02-21

## 2019-03-15 ENCOUNTER — APPOINTMENT (OUTPATIENT)
Dept: GASTROENTEROLOGY | Facility: CLINIC | Age: 44
End: 2019-03-15
Payer: COMMERCIAL

## 2019-03-15 ENCOUNTER — LABORATORY RESULT (OUTPATIENT)
Age: 44
End: 2019-03-15

## 2019-03-15 ENCOUNTER — TRANSCRIPTION ENCOUNTER (OUTPATIENT)
Age: 44
End: 2019-03-15

## 2019-03-15 PROCEDURE — 81025 URINE PREGNANCY TEST: CPT

## 2019-03-15 PROCEDURE — 43239 EGD BIOPSY SINGLE/MULTIPLE: CPT

## 2019-06-04 ENCOUNTER — APPOINTMENT (OUTPATIENT)
Dept: GASTROENTEROLOGY | Facility: CLINIC | Age: 44
End: 2019-06-04
Payer: COMMERCIAL

## 2019-06-04 VITALS
BODY MASS INDEX: 24.8 KG/M2 | DIASTOLIC BLOOD PRESSURE: 66 MMHG | SYSTOLIC BLOOD PRESSURE: 128 MMHG | HEIGHT: 60.25 IN | WEIGHT: 128 LBS | TEMPERATURE: 98.4 F

## 2019-06-04 DIAGNOSIS — L29.0 PRURITUS ANI: ICD-10-CM

## 2019-06-04 DIAGNOSIS — K59.00 CONSTIPATION, UNSPECIFIED: ICD-10-CM

## 2019-06-04 PROCEDURE — 99214 OFFICE O/P EST MOD 30 MIN: CPT

## 2019-06-04 NOTE — REVIEW OF SYSTEMS
[Anxiety] : anxiety [Fever] : no fever [Eye Pain] : no eye pain [Chills] : no chills [Red Eyes] : eyes not red [Loss Of Hearing] : no hearing loss [Chest Pain] : no chest pain [Joint Swelling] : no joint swelling [Skin Lesions] : no skin lesions [Constipation] : no constipation [Muscle Weakness] : no muscle weakness [Depression] : no depression [Easy Bleeding] : no tendency for easy bleeding

## 2019-06-04 NOTE — ASSESSMENT
[FreeTextEntry1] : 1. abdominal bloating\par \par plan low fodmap diet\par         trial of ib guard\par         probiotic\par          abdominal us r/o intraabdominal pathology\par          ppi daily\par \par 2. constipation and rectal itching, given age over 40 recommend colonosopy r/o polyp,mass etc, rectal itching probable hemorrhoids\par \par Discussed risks including but not limited to bleeding,infection,drug reaction, perforation,missed lesion,benefits and alternatives of colonoscopy/egd with patient  including no\par treatment and patient consents to procedure.\par \par plan colonoscopy to be scheduled\par         miralax daily\par         anusol hc as needed

## 2019-06-04 NOTE — PHYSICAL EXAM
[General Appearance - Alert] : alert [General Appearance - Well Developed] : well developed [General Appearance - Well Nourished] : well nourished [General Appearance - In No Acute Distress] : in no acute distress [Hearing Threshold Finger Rub Not [place]] : hearing was normal [Sclera] : the sclera and conjunctiva were normal [Respiration, Rhythm And Depth] : normal respiratory rhythm and effort [Auscultation Breath Sounds / Voice Sounds] : lungs were clear to auscultation bilaterally [Heart Sounds] : normal S1 and S2 [Heart Rate And Rhythm] : heart rate was normal and rhythm regular [Abdomen Soft] : soft [Bowel Sounds] : normal bowel sounds [No Rectal Mass] : no rectal mass [Occult Blood Positive] : stool was negative for occult blood [Internal Hemorrhoid] : internal hemorrhoids [Abnormal Walk] : normal gait [FreeTextEntry1] : no stool in vault [No CVA Tenderness] : no ~M costovertebral angle tenderness [Nail Clubbing] : no clubbing  or cyanosis of the fingernails [] : no rash [Skin Color & Pigmentation] : normal skin color and pigmentation [No Focal Deficits] : no focal deficits [Skin Lesions] : no skin lesions [Oriented To Time, Place, And Person] : oriented to person, place, and time

## 2019-06-04 NOTE — HISTORY OF PRESENT ILLNESS
[FreeTextEntry1] : 44 yo female with c/o rectal itching, and change in stool, thin stool.  no brbpr, no melena. patient reports abdominal bloating, reports change in  diet no help. Patient tried probiotics without much help. Patient was on pantoprazole  helped her before.  patient reports with bm feels better. no weight loss, no brbpr, no melena.\par \par s/p egd 03/2019 intestinal metaplasia, mild gastritis.\par \par no family h/o colon  cancer or gastric cancer.

## 2019-07-11 ENCOUNTER — FORM ENCOUNTER (OUTPATIENT)
Age: 44
End: 2019-07-11

## 2019-07-12 ENCOUNTER — OUTPATIENT (OUTPATIENT)
Dept: OUTPATIENT SERVICES | Facility: HOSPITAL | Age: 44
LOS: 1 days | End: 2019-07-12
Payer: COMMERCIAL

## 2019-07-12 ENCOUNTER — APPOINTMENT (OUTPATIENT)
Dept: ULTRASOUND IMAGING | Facility: CLINIC | Age: 44
End: 2019-07-12
Payer: COMMERCIAL

## 2019-07-12 DIAGNOSIS — Z98.51 TUBAL LIGATION STATUS: Chronic | ICD-10-CM

## 2019-07-12 DIAGNOSIS — R14.0 ABDOMINAL DISTENSION (GASEOUS): ICD-10-CM

## 2019-07-12 PROCEDURE — 76705 ECHO EXAM OF ABDOMEN: CPT | Mod: 26

## 2019-07-12 PROCEDURE — 76705 ECHO EXAM OF ABDOMEN: CPT

## 2019-07-19 ENCOUNTER — APPOINTMENT (OUTPATIENT)
Dept: GASTROENTEROLOGY | Facility: CLINIC | Age: 44
End: 2019-07-19
Payer: COMMERCIAL

## 2019-07-19 ENCOUNTER — LABORATORY RESULT (OUTPATIENT)
Age: 44
End: 2019-07-19

## 2019-07-19 PROCEDURE — 45385 COLONOSCOPY W/LESION REMOVAL: CPT

## 2019-11-05 ENCOUNTER — RX RENEWAL (OUTPATIENT)
Age: 44
End: 2019-11-05

## 2019-11-05 ENCOUNTER — TRANSCRIPTION ENCOUNTER (OUTPATIENT)
Age: 44
End: 2019-11-05

## 2019-11-05 RX ORDER — PANTOPRAZOLE 40 MG/1
40 TABLET, DELAYED RELEASE ORAL
Qty: 90 | Refills: 1 | Status: ACTIVE | COMMUNITY
Start: 2019-01-08 | End: 1900-01-01

## 2019-12-05 ENCOUNTER — RX RENEWAL (OUTPATIENT)
Age: 44
End: 2019-12-05

## 2019-12-06 ENCOUNTER — RX RENEWAL (OUTPATIENT)
Age: 44
End: 2019-12-06

## 2019-12-06 DIAGNOSIS — J30.9 ALLERGIC RHINITIS, UNSPECIFIED: ICD-10-CM

## 2019-12-06 RX ORDER — AZELASTINE HYDROCHLORIDE 137 UG/1
0.1 SPRAY, METERED NASAL TWICE DAILY
Qty: 1 | Refills: 3 | Status: ACTIVE | COMMUNITY
Start: 2019-12-06 | End: 1900-01-01

## 2020-02-19 ENCOUNTER — LABORATORY RESULT (OUTPATIENT)
Age: 45
End: 2020-02-19

## 2020-02-19 ENCOUNTER — APPOINTMENT (OUTPATIENT)
Dept: INTERNAL MEDICINE | Facility: CLINIC | Age: 45
End: 2020-02-19
Payer: COMMERCIAL

## 2020-02-19 VITALS
SYSTOLIC BLOOD PRESSURE: 104 MMHG | DIASTOLIC BLOOD PRESSURE: 60 MMHG | HEART RATE: 65 BPM | BODY MASS INDEX: 24.8 KG/M2 | WEIGHT: 128 LBS | HEIGHT: 60.25 IN | OXYGEN SATURATION: 98 %

## 2020-02-19 DIAGNOSIS — K29.70 GASTRITIS, UNSPECIFIED, W/OUT BLEEDING: ICD-10-CM

## 2020-02-19 DIAGNOSIS — Z23 ENCOUNTER FOR IMMUNIZATION: ICD-10-CM

## 2020-02-19 DIAGNOSIS — D12.6 BENIGN NEOPLASM OF COLON, UNSPECIFIED: ICD-10-CM

## 2020-02-19 DIAGNOSIS — Z87.19 PERSONAL HISTORY OF OTHER DISEASES OF THE DIGESTIVE SYSTEM: ICD-10-CM

## 2020-02-19 DIAGNOSIS — B96.81 GASTRITIS, UNSPECIFIED, W/OUT BLEEDING: ICD-10-CM

## 2020-02-19 DIAGNOSIS — Z13.31 ENCOUNTER FOR SCREENING FOR DEPRESSION: ICD-10-CM

## 2020-02-19 DIAGNOSIS — Z00.00 ENCOUNTER FOR GENERAL ADULT MEDICAL EXAMINATION W/OUT ABNORMAL FINDINGS: ICD-10-CM

## 2020-02-19 PROCEDURE — 90686 IIV4 VACC NO PRSV 0.5 ML IM: CPT

## 2020-02-19 PROCEDURE — G0008: CPT

## 2020-02-19 PROCEDURE — G0444 DEPRESSION SCREEN ANNUAL: CPT | Mod: NC,59

## 2020-02-19 PROCEDURE — 99396 PREV VISIT EST AGE 40-64: CPT | Mod: 25

## 2020-02-19 RX ORDER — HYDROCORTISONE ACETATE 25 MG/1
25 SUPPOSITORY RECTAL TWICE DAILY
Qty: 56 | Refills: 1 | Status: DISCONTINUED | COMMUNITY
Start: 2019-06-04 | End: 2020-02-19

## 2020-02-19 RX ORDER — TERCONAZOLE 8 MG/G
0.8 CREAM VAGINAL
Qty: 20 | Refills: 0 | Status: DISCONTINUED | COMMUNITY
Start: 2018-09-11 | End: 2020-02-19

## 2020-02-19 NOTE — COUNSELING
[Healthy eating counseling provided] : healthy eating [None] : None [Needs reinforcement, provided] : Patient needs reinforcement on understanding lifestyle changes and  the steps needed to achieve self management goals and reinforcement was provided [Activity counseling provided] : activity

## 2020-02-19 NOTE — PHYSICAL EXAM
[General Appearance - Alert] : alert [General Appearance - Well Nourished] : well nourished [General Appearance - In No Acute Distress] : in no acute distress [General Appearance - Well Developed] : well developed [General Appearance - Well-Appearing] : healthy appearing [Sclera] : the sclera and conjunctiva were normal [PERRL With Normal Accommodation] : pupils were equal in size, round, and reactive to light [Extraocular Movements] : extraocular movements were intact [Optic Disc Abnormality] : the optic disc were normal in size and color [Outer Ear] : the ears and nose were normal in appearance [Both Tympanic Membranes Were Examined] : both tympanic membranes were normal [Oropharynx] : the oropharynx was normal [Neck Appearance] : the appearance of the neck was normal [Neck Cervical Mass (___cm)] : no neck mass was observed [Jugular Venous Distention Increased] : there was no jugular-venous distention [Thyroid Diffuse Enlargement] : the thyroid was not enlarged [Thyroid Nodule] : there were no palpable thyroid nodules [Auscultation Breath Sounds / Voice Sounds] : lungs were clear to auscultation bilaterally [Heart Rate And Rhythm] : heart rate was normal and rhythm regular [Heart Sounds] : normal S1 and S2 [Heart Sounds Gallop] : no gallops [Murmurs] : no murmurs [Heart Sounds Pericardial Friction Rub] : no pericardial rub [Arterial Pulses Carotid] : carotid pulses were normal with no bruits [Full Pulse] : the pedal pulses are present [Edema] : there was no peripheral edema [Breast Appearance] : normal in appearance [Breast Palpation Mass] : no palpable masses [Breast Abnormal Lactation (Galactorrhea)] : no nipple discharge [Bowel Sounds] : normal bowel sounds [Abdomen Soft] : soft [Abdomen Tenderness] : non-tender [Abdomen Mass (___ Cm)] : no abdominal mass palpated [Abdomen Hernia] : no hernia was discovered [Cervical Lymph Nodes Enlarged Posterior Bilaterally] : posterior cervical [Cervical Lymph Nodes Enlarged Anterior Bilaterally] : anterior cervical [Supraclavicular Lymph Nodes Enlarged Bilaterally] : supraclavicular [Axillary Lymph Nodes Enlarged Bilaterally] : axillary [Femoral Lymph Nodes Enlarged Bilaterally] : femoral [Inguinal Lymph Nodes Enlarged Bilaterally] : inguinal [No CVA Tenderness] : no ~M costovertebral angle tenderness [No Spinal Tenderness] : no spinal tenderness [Nail Clubbing] : no clubbing  or cyanosis of the fingernails [Musculoskeletal - Swelling] : no joint swelling seen [Motor Tone] : muscle strength and tone were normal [Skin Turgor] : normal skin turgor [] : no rash [Cranial Nerves] : cranial nerves 2-12 were intact [No Focal Deficits] : no focal deficits [Oriented To Time, Place, And Person] : oriented to person, place, and time [Impaired Insight] : insight and judgment were intact [Affect] : the affect was normal [FreeTextEntry1] : Small cyst left areola that is white and soft - millial in appearance.  Occasionally expressees white material.  Has had for ages.  GYN has told her there is nothing to do about it but she wishes to have it taken care of.

## 2020-02-19 NOTE — HEALTH RISK ASSESSMENT
[Good] : ~his/her~  mood as  good [No falls in past year] : Patient reported no falls in the past year [Patient reported PAP Smear was normal] : Patient reported PAP Smear was normal [Fully functional (bathing, dressing, toileting, transferring, walking, feeding)] : Fully functional (bathing, dressing, toileting, transferring, walking, feeding) [Fully functional (using the telephone, shopping, preparing meals, housekeeping, doing laundry, using] : Fully functional and needs no help or supervision to perform IADLs (using the telephone, shopping, preparing meals, housekeeping, doing laundry, using transportation, managing medications and managing finances) [] : No [0] : 2) Feeling down, depressed, or hopeless: Not at all (0) [de-identified] : Occasional [de-identified] : Exercises regularly [de-identified] : Avoids vegetables because of her stomach issues [[id number]] : 0 [Patient reported mammogram was normal] : Patient reported mammogram was normal [Change in mental status noted] : No change in mental status noted [Language] : denies difficulty with language [Behavior] : denies difficulty with behavior [Learning/Retaining New Information] : denies difficulty learning/retaining new information [Handling Complex Tasks] : denies difficulty handling complex tasks [Reasoning] : denies difficulty with reasoning [Spatial Ability and Orientation] : denies difficulty with spatial ability and orientation [None] : None [With Family] : lives with family [Employed] : employed [] :  [# Of Children ___] : has [unfilled] children [Reports changes in hearing] : Reports no changes in hearing [Reports changes in vision] : Reports no changes in vision [Reports changes in dental health] : Reports no changes in dental health [Smoke Detector] : smoke detector [Carbon Monoxide Detector] : carbon monoxide detector [Guns at Home] : no guns at home [Seat Belt] :  uses seat belt [Sunscreen] : uses sunscreen [Travel to Developing Areas] : does not  travel to developing areas [MammogramDate] : 95805 [PapSmearDate] : 12/19 [ColonoscopyDate] : 07/19 [ColonoscopyComments] : Repeat in [place] 7/21 [

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
## 2020-02-19 NOTE — DISCUSSION/SUMMARY
[Health Maintenance Visit] : health maintenance visit [Healthy Adult Female] : healthy adult female [Healthy Diet] : eats a healthy diet [Adequate Exercise Regimen] : has an adequate exercise regimen [Screening Current] : cervical cancer screening is current [Mammogram Every Year] : mammogram is needed every year [Screening Managed by ___] : breast cancer screening is managed by [unfilled] [BMD Testing Not Indicated] : bone mineral density testing is not indicated [Hemoglobin] : hemoglobin [Lipid Profile] : lipid profile [Glucose] : glucose [Thyroid Function Testing] : thyroid function testing [25-Hydroxyvitamin D] : 25-hydroxyvitamin D [Immunizations Up to Date] : immunizations are up to date [Dentist] : a dentist [Sunscreen Use] : sunscreen use [Weight Loss] : weight loss [Self Skin Examination] : self skin examination [Seat Belt Use] : seat belt use [Patient] : discussed with the patient [FreeTextEntry1] : \par 1.  OCD -not an issue today no longer on meds.  She will always have to work on her tendencies but she feels that it remains controlled\par 2.  Insomnia - long standing.  She has no trouble initiating sleep at this point but rather maintaining it.  Patient states that this is improving\par 3.  Nipple lesion - long-standing and c/w a cyst.   She can express cheesy-smelling white discharge from it.  \par 4.  HCM - see below\par 5.  H/O latent TB - no constitutional signs or symptoms\par 6.  GI issues -longstanding in nature.  His bloating, lower abdominal pain and cramps.  Feels that vegetables and dairy worsen it.  Uses Beano but has never tried simethicone.  Will give a trial of this.  Has kept a diary in the past to look for any triggers.  At this point realizes that she has to live with this.  Is also taking a probiotic\par 7.  Flu shot today\par 8.  To try and get more information regarding the reason for her mother having a what sounds like partial colectomy.  Her mother passed away due to surgical complications.  Evidently when they stapled the bowel together it leaked.  She said they were blisters that were ready to pop.  Questionable diverticulitis as they do not sound like they would have done surgery for polyps in the setting.  She was asking because she wanted to know when to start screening for colorectal cancer.\par 9.  Labs as per plan\par 10.  Return to office in 1 year or as needed\par  [Next Colonoscopy Due ___] : the next colonoscopy is due [unfilled] [Mammogram Current] : mammogram is current

## 2020-03-06 LAB
25(OH)D3 SERPL-MCNC: 22.5 NG/ML
ALBUMIN SERPL ELPH-MCNC: 4.4 G/DL
ALP BLD-CCNC: 43 U/L
ALT SERPL-CCNC: 17 U/L
ANION GAP SERPL CALC-SCNC: 11 MMOL/L
AST SERPL-CCNC: 17 U/L
BASOPHILS # BLD AUTO: 0.04 K/UL
BASOPHILS NFR BLD AUTO: 1.5 %
BILIRUB SERPL-MCNC: 0.3 MG/DL
BUN SERPL-MCNC: 17 MG/DL
CALCIUM SERPL-MCNC: 9.1 MG/DL
CHLORIDE SERPL-SCNC: 107 MMOL/L
CHOLEST SERPL-MCNC: 141 MG/DL
CHOLEST/HDLC SERPL: 2.6 RATIO
CO2 SERPL-SCNC: 24 MMOL/L
CREAT SERPL-MCNC: 0.58 MG/DL
EOSINOPHIL # BLD AUTO: 0.04 K/UL
EOSINOPHIL NFR BLD AUTO: 1.5 %
ESTIMATED AVERAGE GLUCOSE: 105 MG/DL
GLUCOSE SERPL-MCNC: 77 MG/DL
HBA1C MFR BLD HPLC: 5.3 %
HCT VFR BLD CALC: 39.8 %
HDLC SERPL-MCNC: 54 MG/DL
HGB BLD-MCNC: 12.2 G/DL
IMM GRANULOCYTES NFR BLD AUTO: 0 %
LDLC SERPL CALC-MCNC: 74 MG/DL
LYMPHOCYTES # BLD AUTO: 1.1 K/UL
LYMPHOCYTES NFR BLD AUTO: 40.4 %
MAN DIFF?: NORMAL
MCHC RBC-ENTMCNC: 30.7 GM/DL
MCHC RBC-ENTMCNC: 31.7 PG
MCV RBC AUTO: 103.4 FL
MONOCYTES # BLD AUTO: 0.27 K/UL
MONOCYTES NFR BLD AUTO: 9.9 %
NEUTROPHILS # BLD AUTO: 1.27 K/UL
NEUTROPHILS NFR BLD AUTO: 46.7 %
PLATELET # BLD AUTO: 213 K/UL
POTASSIUM SERPL-SCNC: 4.5 MMOL/L
PROT SERPL-MCNC: 6.3 G/DL
RBC # BLD: 3.85 M/UL
RBC # FLD: 12.4 %
SODIUM SERPL-SCNC: 141 MMOL/L
T4 FREE SERPL-MCNC: 1.1 NG/DL
TRIGL SERPL-MCNC: 62 MG/DL
TSH SERPL-ACNC: 1.29 UIU/ML
WBC # FLD AUTO: 2.72 K/UL

## 2020-12-01 ENCOUNTER — APPOINTMENT (OUTPATIENT)
Dept: INTERNAL MEDICINE | Facility: CLINIC | Age: 45
End: 2020-12-01
Payer: COMMERCIAL

## 2020-12-01 DIAGNOSIS — Z87.898 PERSONAL HISTORY OF OTHER SPECIFIED CONDITIONS: ICD-10-CM

## 2020-12-01 DIAGNOSIS — R63.4 ABNORMAL WEIGHT LOSS: ICD-10-CM

## 2020-12-01 PROCEDURE — 99213 OFFICE O/P EST LOW 20 MIN: CPT | Mod: 95

## 2020-12-01 NOTE — HISTORY OF PRESENT ILLNESS
[Home] : at home, [unfilled] , at the time of the visit. [Other Location: e.g. Home (Enter Location, City,State)___] : at [unfilled] [Verbal consent obtained from patient] : the patient, [unfilled] [de-identified] : 45-year-old female with a history of latent TB, tubular adenoma of the colon, anxiety and OCD who presents via telehealth today with complaints of unintentional weight loss.  Over the summer her weight was approximately 126 pounds.  During that time she was doing a lot of walking and exercising.  Since then as the weather has gotten colder she is no longer doing the same amount of exercise and she has noticed that her weight has dropped to 117 pounds.  Is not on any diet and has not changed the way that she is eating.  No complaints of night sweats but her  complains that she is hot.  She is to have insomnia but that has resolved.  In addition she denies any fevers or lymphadenopathy.

## 2020-12-01 NOTE — PHYSICAL EXAM
[No Acute Distress] : no acute distress [Well Nourished] : well nourished [Well Developed] : well developed [Well-Appearing] : well-appearing [Supple] : supple [No Respiratory Distress] : no respiratory distress  [Normal Anterior Cervical Nodes] : no anterior cervical lymphadenopathy [No Focal Deficits] : no focal deficits [Normal Affect] : the affect was normal [Normal Insight/Judgement] : insight and judgment were intact [de-identified] : No lymphadenopathy visible and upon having patient palpated her neck.

## 2020-12-01 NOTE — ASSESSMENT
[FreeTextEntry1] : 45-year-old female with complaints of unintentional weight loss.  Last labs reviewed and she did have a mild increase in her MCV and a decreased white count.  Will repeat that at this time along with a B12 and folic acid.  Will check TFTs to rule out hyperthyroidism as well as a CMP for any abnormalities.  Most likely explanation is that she is stopped vigorous exercise and has lost some muscle mass.  Orders were placed into the chart and she will go to a core lab and I will follow-up with her after I get the results.

## 2020-12-09 ENCOUNTER — APPOINTMENT (OUTPATIENT)
Dept: INTERNAL MEDICINE | Facility: CLINIC | Age: 45
End: 2020-12-09
Payer: COMMERCIAL

## 2020-12-09 DIAGNOSIS — D70.4 CYCLIC NEUTROPENIA: ICD-10-CM

## 2020-12-09 LAB
ALBUMIN SERPL ELPH-MCNC: 4.7 G/DL
ALP BLD-CCNC: 55 U/L
ALT SERPL-CCNC: 19 U/L
ANION GAP SERPL CALC-SCNC: 9 MMOL/L
AST SERPL-CCNC: 19 U/L
BASOPHILS # BLD AUTO: 0.04 K/UL
BASOPHILS NFR BLD AUTO: 1.1 %
BILIRUB SERPL-MCNC: 0.2 MG/DL
BUN SERPL-MCNC: 11 MG/DL
CALCIUM SERPL-MCNC: 9.4 MG/DL
CHLORIDE SERPL-SCNC: 103 MMOL/L
CO2 SERPL-SCNC: 28 MMOL/L
CREAT SERPL-MCNC: 0.63 MG/DL
EOSINOPHIL # BLD AUTO: 0.07 K/UL
EOSINOPHIL NFR BLD AUTO: 1.9 %
ESTIMATED AVERAGE GLUCOSE: 105 MG/DL
FOLATE SERPL-MCNC: 16.7 NG/ML
GLUCOSE SERPL-MCNC: 85 MG/DL
HBA1C MFR BLD HPLC: 5.3 %
HCT VFR BLD CALC: 38.6 %
HGB BLD-MCNC: 12.4 G/DL
IMM GRANULOCYTES NFR BLD AUTO: 0 %
LYMPHOCYTES # BLD AUTO: 1.66 K/UL
LYMPHOCYTES NFR BLD AUTO: 45.1 %
MAN DIFF?: NORMAL
MCHC RBC-ENTMCNC: 31.8 PG
MCHC RBC-ENTMCNC: 32.1 GM/DL
MCV RBC AUTO: 99 FL
MONOCYTES # BLD AUTO: 0.42 K/UL
MONOCYTES NFR BLD AUTO: 11.4 %
NEUTROPHILS # BLD AUTO: 1.49 K/UL
NEUTROPHILS NFR BLD AUTO: 40.5 %
PLATELET # BLD AUTO: 216 K/UL
POTASSIUM SERPL-SCNC: 3.6 MMOL/L
PROT SERPL-MCNC: 6.7 G/DL
RBC # BLD: 3.9 M/UL
RBC # FLD: 12.4 %
SODIUM SERPL-SCNC: 140 MMOL/L
T4 FREE SERPL-MCNC: 1.2 NG/DL
TSH SERPL-ACNC: 1.9 UIU/ML
VIT B12 SERPL-MCNC: 386 PG/ML
WBC # FLD AUTO: 3.68 K/UL

## 2020-12-09 PROCEDURE — 99441: CPT

## 2020-12-15 ENCOUNTER — APPOINTMENT (OUTPATIENT)
Dept: GASTROENTEROLOGY | Facility: CLINIC | Age: 45
End: 2020-12-15
Payer: COMMERCIAL

## 2020-12-15 DIAGNOSIS — R10.9 UNSPECIFIED ABDOMINAL PAIN: ICD-10-CM

## 2020-12-15 PROCEDURE — 99214 OFFICE O/P EST MOD 30 MIN: CPT | Mod: 95

## 2020-12-15 NOTE — REVIEW OF SYSTEMS
[As Noted in HPI] : as noted in HPI [Fever] : no fever [Chills] : no chills [Eyesight Problems] : no eyesight problems [Nosebleeds] : no nosebleeds [Chest Pain] : no chest pain [Cough] : no cough [SOB on Exertion] : no shortness of breath during exertion [Itching] : no itching [Change In A Mole] : no change in a mole [Dizziness] : no dizziness [Fainting] : no fainting [Anxiety] : no anxiety [Depression] : no depression [Muscle Weakness] : no muscle weakness [Easy Bleeding] : no tendency for easy bleeding [Easy Bruising] : no tendency for easy bruising

## 2020-12-15 NOTE — REASON FOR VISIT
[Home] : at home, [unfilled] , at the time of the visit. [Medical Office: ([facility])___] : at the medical office located in

## 2020-12-15 NOTE — PHYSICAL EXAM
[General Appearance - Alert] : alert [General Appearance - In No Acute Distress] : in no acute distress [General Appearance - Well Nourished] : well nourished [General Appearance - Well Developed] : well developed [Sclera] : the sclera and conjunctiva were normal [Hearing Threshold Finger Rub Not Stevens] : hearing was normal [Neck Cervical Mass (___cm)] : no neck mass was observed [Bowel Sounds] : normal bowel sounds [Abdomen Soft] : soft [Abdomen Tenderness] : non-tender [No CVA Tenderness] : no ~M costovertebral angle tenderness [Abnormal Walk] : normal gait [Nail Clubbing] : no clubbing  or cyanosis of the fingernails [] : no rash [Skin Lesions] : no skin lesions [Oriented To Time, Place, And Person] : oriented to person, place, and time

## 2020-12-15 NOTE — HISTORY OF PRESENT ILLNESS
[FreeTextEntry1] : 44 yo female  with c/o early satiety, dyspepsia  with bloating. rumbling stomach.no gerd. Last time used ppi one month ago. no n/v  patient reports  7lb weight loss. normal bms, no brbpr, no melena.

## 2020-12-15 NOTE — ASSESSMENT
[FreeTextEntry1] : Dyspepsia with weight loss, s/p egd  last year unrevealing, s/p colonoscopy last year polyp removed. PPI did not help her symptoms, also with c/o anxiety. stools more constipated \par \par \par plan h2 blocker bid\par        ct scan abdomen/pelvis\par           f/u after ct scan\par            recommend patient seek treatment for anxiety as well.

## 2020-12-22 NOTE — ASU DISCHARGE PLAN (ADULT/PEDIATRIC). - ACCOMPANYING ADULT'S SIGNATURE_______________________________________
Statement Selected W Plasty Text: The lesion was extirpated to the level of the fat with a #15 scalpel blade.  Given the location of the defect, shape of the defect and the proximity to free margins a W-plasty was deemed most appropriate for repair.  Using a sterile surgical marker, the appropriate transposition arms of the W-plasty were drawn incorporating the defect and placing the expected incisions within the relaxed skin tension lines where possible.    The area thus outlined was incised deep to adipose tissue with a #15 scalpel blade.  The skin margins were undermined to an appropriate distance in all directions utilizing iris scissors.  The opposing transposition arms were then transposed into place in opposite direction and anchored with interrupted buried subcutaneous sutures.

## 2020-12-30 ENCOUNTER — OUTPATIENT (OUTPATIENT)
Dept: OUTPATIENT SERVICES | Facility: HOSPITAL | Age: 45
LOS: 1 days | End: 2020-12-30
Payer: COMMERCIAL

## 2020-12-30 ENCOUNTER — RESULT REVIEW (OUTPATIENT)
Age: 45
End: 2020-12-30

## 2020-12-30 ENCOUNTER — APPOINTMENT (OUTPATIENT)
Dept: CT IMAGING | Facility: CLINIC | Age: 45
End: 2020-12-30

## 2020-12-30 DIAGNOSIS — Z98.51 TUBAL LIGATION STATUS: Chronic | ICD-10-CM

## 2020-12-30 DIAGNOSIS — Z00.8 ENCOUNTER FOR OTHER GENERAL EXAMINATION: ICD-10-CM

## 2020-12-30 PROCEDURE — 74177 CT ABD & PELVIS W/CONTRAST: CPT

## 2020-12-30 PROCEDURE — 74177 CT ABD & PELVIS W/CONTRAST: CPT | Mod: 26

## 2021-03-16 ENCOUNTER — APPOINTMENT (OUTPATIENT)
Dept: GASTROENTEROLOGY | Facility: CLINIC | Age: 46
End: 2021-03-16
Payer: COMMERCIAL

## 2021-03-16 DIAGNOSIS — R14.0 ABDOMINAL DISTENSION (GASEOUS): ICD-10-CM

## 2021-03-16 DIAGNOSIS — R19.7 DIARRHEA, UNSPECIFIED: ICD-10-CM

## 2021-03-16 PROCEDURE — 99213 OFFICE O/P EST LOW 20 MIN: CPT | Mod: 95

## 2021-03-19 PROBLEM — R19.7 DIARRHEA, UNSPECIFIED TYPE: Status: ACTIVE | Noted: 2021-03-19

## 2021-03-19 NOTE — ASSESSMENT
[FreeTextEntry1] : abdominal bloating persistent, weight stable, pt concerned about parasites has soft stool.\par \par plan low FODMAP diet\par          stool culture ordered\par           pending on results consider xifaxan

## 2021-03-19 NOTE — HISTORY OF PRESENT ILLNESS
[FreeTextEntry1] : 44 yo female with h/o weight loss, s/p egd/colonoscopy /ct scan unrevealing, currently weight stable.\par Patient with c/o persistent bloating and soft stools, no brbpr, no abdominal pain\par Pt concerned about parasitis, also with lactose intolerance. has bm daily.\par \par probiotics makes symptoms worse\par  gerd controlled on ppi

## 2021-03-19 NOTE — PHYSICAL EXAM
[General Appearance - Alert] : alert [General Appearance - In No Acute Distress] : in no acute distress [General Appearance - Well Nourished] : well nourished [General Appearance - Well Developed] : well developed [Sclera] : the sclera and conjunctiva were normal [Auscultation Breath Sounds / Voice Sounds] : lungs were clear to auscultation bilaterally [Bowel Sounds] : normal bowel sounds [Abdomen Soft] : soft [Abdomen Tenderness] : non-tender [No CVA Tenderness] : no ~M costovertebral angle tenderness [Nail Clubbing] : no clubbing  or cyanosis of the fingernails [] : no rash [Skin Lesions] : no skin lesions [No Focal Deficits] : no focal deficits [Oriented To Time, Place, And Person] : oriented to person, place, and time

## 2021-03-19 NOTE — REVIEW OF SYSTEMS
[As Noted in HPI] : as noted in HPI [Fever] : no fever [Chills] : no chills [Eyesight Problems] : no eyesight problems [Nosebleeds] : no nosebleeds [Pelvic Pain] : no pelvic pain [Dysmenorrhea] : no dysmenorrhea [Itching] : no itching [Dizziness] : no dizziness [Fainting] : no fainting [Anxiety] : no anxiety [Depression] : no depression [Muscle Weakness] : no muscle weakness [Easy Bleeding] : no tendency for easy bleeding [Easy Bruising] : no tendency for easy bruising

## 2021-03-26 LAB
C DIFF TOX GENS STL QL NAA+PROBE: NORMAL
CDIFF BY PCR: NOT DETECTED
GI PCR PANEL, STOOL: NORMAL

## 2021-03-26 RX ORDER — RIFAXIMIN 550 MG/1
550 TABLET ORAL
Qty: 40 | Refills: 0 | Status: ACTIVE | COMMUNITY
Start: 2021-03-26 | End: 1900-01-01

## 2021-09-17 ENCOUNTER — RX RENEWAL (OUTPATIENT)
Age: 46
End: 2021-09-17

## 2021-09-17 RX ORDER — FAMOTIDINE 40 MG/1
40 TABLET, FILM COATED ORAL TWICE DAILY
Qty: 60 | Refills: 0 | Status: ACTIVE | COMMUNITY
Start: 2020-12-15 | End: 1900-01-01

## 2022-06-20 NOTE — PATIENT PROFILE ADULT. - NS MD [facility] INPLANTS MED DEV
Jack Dutton can you please call patient have her schedule an appointment with me to discuss abnormal labs.  Thank you None

## 2022-09-21 NOTE — H&P PST ADULT - SPIRITUAL CULTURAL, CURRENT SITUATION, PROFILE
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  NO significant rectocele      Patient has history of PESSARY use ending 2020.  She replaced the pessary in 2020 as prolapse symptoms were bothersome.  Early : ceased pessary use.  PESSARY fitting at last visit: today demonstrated NO benefit to RINg or GELLHORN pessary    POSTMENOPAUSAL ATROPHIC VAGINITIS      N81.5 Vaginal enterocele  N95.2 Postmenopausal atrophic vaginitis  N39.41 Urge incontinence  R35.0 Frequency of micturition  N81.10 Cystocele, unspecified  N39.3 Stress incontinence (female) : Noted with POP-reduced at 100 ml volume; UDS 2022      PLAN     Post-urination Bladder volume / PVR (ml): 40  Urinalysis- straight catheter specimen, automated dipstick  --  HEMATURIA: Negative  NITRITES: Positive; evidence of bacteria but there is no inflammation  LEUKOCYTES: Negative  +------------------------------------------------------------------------+  estradiol (estrogen) vaginal cream 1 gram dose , 2 doses per week    ESTROGEN cream RX was sent to a compounding pharmacy. This was also done in the past.  eRx sent to [facility], [place], [place].  -----      Urodynamics Evaluation: Bladder function testing done in our office. This will measure bladder storage function and will measure urination function. Attention to assessing for the  cause of the urinary leakage.    PREANESTHESIA MEDICAL EXAM  SURGICAL options typically include the following 3 options as well as additional considerations listed below:  1) VAGINAL Prolapse repair- UTEROSACRAL suspension , anterior repair - midline and possible paravaginal repairs, posterior colpoperineorrhaphy,  cystoscopy  2) DAVINCI PLATFORM or LAPAROSCOPIC SACRO-COLPOPEXY , Y-MESH, polypropylene mesh, cystoscopy  3) COLPOCLEISIS / PARTIAL COLPECTOMY; CYSTOURETHROSCOPY: vagina wall closure    * EACH option often includes hysterectomy with consideration for removal of tubes and ovaries  + EACH option also includes consideration for a procedure to treat stress urinary incontinence, typically a  MID- URETHRAL SLING, retropubic    PRIOR hysterectomy is known.  --  CURRENT SURGICAL  DAVINCI PLATFORM SACRO-COLPOPEXY , Y-MESH PLAN, , CYSTOSCOPY  POSSIBLE  MID- URETHRAL SLING, retropubic       -------------------------------------------------------------------  Active complaints c/w last visit.  no pessary use in 1.5 years feels comfortable  uncontrolled urination and rectum no control with defecation  needs to control fluids and diet for proper elimination function  pt wants to discuss surgical options  interstim not helpful, completed PT not helpful  changes clothes x3-4 /week  frequency 1- 3 hours  insensible periodic urine loss  not sexually active   inconsistent with vaginal estrogen use  ======  CC/Currently:    MOTIVATED to proceed with surgical prolpase repair  Questions regarding plan and alternatives were active issue at this time.        ----------------------------------------------------------------------------------  MEDICATIONS  ( ___   SUPPLEMENTS Scanned into MEDICATIONS section of DOCUMENTS tab)    --Estradiol 0.1 MG/1 GM Cream 1 gram per Vagina, twice per week. Bravo:1 Refill: 3  --metFORMIN HCl 500 MG Tablet  --Lansoprazole Oral Cap DR 30MG  --HYDROCHLOROTHIAZIDE  --Vitamin D  --Simvastatin 20  MG  --Lisinopril 10MG         PATIENT REPORTS ALLERGIES: * NKDA    OBSTETRICS & GYNECOLOGIC HISTORY:  MENOPAUSAL? Yes  TOTAL NUMBER OF PREGNANCIES: * 6  NUMBER OF LIVING CHILDREN: * 4  NUMBER OF VAGINAL DELIVERIES: * 4  NUMBER OF C-SECTIONS: * 0  NUMBER OF MISCARRIAGES OR ABORTIONS: * 2  WEIGHT OF LARGEST BABY: * 8 LBS 14 OZ  AGE WHEN PERIODS STARTED: * 13    MEDICAL HISTORY:  HIGH CHOLESTEROL  HIGH BLOOD PRESSURE  GERD / REFLUX DISEASE  IMPAIRED VISION / WEARS GLASSES    FAMILY HISTORY:  HEART DISEASE  HIGH BLOOD PRESSURE  BREAST CANCER    SOCIAL HISTORY:  OCCASIONALLY DRINKS ALCOHOL  USES HERBAL SUPPLEMENTS * GREEN TEA    SURGICAL HISTORY:  ANTERIOR REPAIR  HYSTERECTOMY  MESH PROCEDURE  SLING PROCEDURE * FAILED AFTER 4 MONTHS    OTHER: * 1965 RHINOPLASTY     HYSTERECTOMY: 11/1998 Feb, 2001: CYSTOCELE, RECTOCELE REPAIR    IU SURGERIES:  PATIENT HAS UNDERGONE NO IU SURGERIES? Yes * NO IU SURGERIES   -    x  ADDITIONAL GYNECOLOGIC HISTORY  DATE OF LAST MAMMOGRAM: 08/2012  PRIOR HYST  x  UROGYNECOLOGY SUMMARY  Urogynecology History was reviewed for the following medical problems: Prolapse, LUH, Urge Incontinence, Anal Incontinence, Nocturia, Frequency, Incomplete Emptying, Constipation and Pain.  Positive findings are listed to the right:    Active complaints c/w last visit.  no pessary use in 1.5 years - feels comfortable  uncontrolled urination and rectum no control with defecation  needs to control fluids and diet for proper elimination function  pt wants to discuss surgical options  interstim not helpful, completed PT not helpful  changes clothes x3-4 /week  frequency 1- 3 hours  + insensible periodic urine loss    not sexually active  inconsistent with vaginal estrogen use    REVIEW OF SYSTEMS  Review of General, Eyes, ENT, CV, Resp, Mus, Skin, Neuro, Endo, Hem, and Psych for symptoms of Fever or Chills, Unwanted Weight Loss, Blurred Vision, Ear Infection, Sort Throat, Chest Pain, Palpitations, Shortness of  Breath, Coughing, Wheezing, Abdominal Pain, Nausea/Vomiting, Diarrhea, Joint Pain, Skin Rash, Dizziness, Numbness or Tingling, Excessive Thirst, Easy Bruising, Feeling Anxiety and Feeling Depressed was completed.  Positive findings are listed to the right:  NEGATIVE      Nursing Education provided     Maria Del Carmen Zimmerman, RN,        x  PHYSICAL EXAM    Vitals: Height:62, Weight:155, BP Systolic:138, BP Diastolic:74, BMI:28.35      GENERAL EXAM  GENERAL: Well Developed, Well Nourished, No Acute Distress  HEENT: Examination Within Normal Limits, No Lesions    PELVIC EXAM  EXTERNAL GENITALIA: No Abnormalities  URETHRA: Normal  BLADDER: Non Tender  VAGINA: No lesions, Mild atrophic changes, more significant at vaginal apex.      BIMANUAL/ADNEXA: Nontender, No Masses  PERINEUM: Normal  ANUS: Normal  RECTUM: No MILLIE    =====    PELVIC FLOOR NEUROMUSCULAR FUNCTION  ABLE TO RELAX       PELVIC SUPPORT  Aa: -3 Ba: 0 C: 0.5  gh: 3. pb: 4 tvl : 6.5/7  Ap: -3 Bp: -3 D: x      UVJ > 30    CST: NEG: post void    DISCUSSION ITEMS  Diagnostic differentiation of stress incontinence versus urge incontinence was discussed  ANATOMIC features of her vaginal support were described and demonstrated with diagrams.  Rationale for pessary use as a short term and/or long term option was discussed.    Nonsurgical and Surgical Treatments for LUH  Behavioral and Pharmacologic Treatments for OAB  Nonsurgical and Surgical Treatments for POP  Urodynamics for Evaluation of bladder function with assessment of urination and bladder storage control.  ROLE for sling and reassessment today for PVR discussed.  -         Urodynamics Evaluation    Date  :    07/27/2022     Patient Name:     LEANA PADILLA    IMPRESSION       LUH:    Yes   @   100       Urethral Hypermobility?:   Yes       MUCP:      Test 1:   30     Test 2:    29       CLPP:     140    DO:    No        care home:   290    ml    ----------------------------------------------  U/F:    53    ml ; voided  volume    PVR:   107    ml      P/F:    Voided:  254    ml    PVR:    118    ml    RN COMMENTS  Pt reports UUI and incomplete emptying often.  Denies LUH.Small to moderate loss on LPPs.    Pdet stable.  Verbal and written discharge instructions provided.  Pt tolerated procedure well.    EXAM      U/A DIP: WBC - Negative, Nitrate - Negative, Blood - Negative  UVJ: > 30, [place]  PERINEAL SENSATION: Normal  PROLAPSE      PROLAPSE:past introitus  PROLAPSE REDUCED FOR TESTING?: Yes  SPECULUM  PROCTOSWAB        DIAGNOSIS       N39.3 STRESS INCONTINENCE; CST POSITIVE at 100 ml; prolapse reduced.  --  NO DETRUSOR OVERACTIVITY  group home 290mL  ---  R39.19 VOIDING DYSFUNCTION  UROFLOWMETRY  INCOMPLETE EMPTYING with low volume, 163 mL, at voiding assessment.    VOIDED VOLUME (ml): 53  MAX FLOW RATE (ml/sec): 3  AVERAGE FLOW RATE (ml/sec): 1  POST-VOID RESIDUAL Volume (ml): 107  VOID: Atypical  PATTERN: Poor Flow  ------    PRESSURE/FLOW STUDY  VOIDED VOLUME (ml): 254  MAX FLOW RATE (ml/s): 19  P-DET, Maximum ( cm pH2O): 113  P-DET, at max flow (cm H2O): 52  POST VOID RESIDUAL (ml): 118  VOIDING MECHANISM: Void: Typical per patient  Normal  ------    Reviewed By:    PAYAM FLOWER MD      PVR at last OFFICE VIST:  Post-urination Bladder volume / PVR (ml): 40  -  FINDINGS c/w inhibited voids      SURGICAL DISCUSSION  Decision-making regarding pessary versus repair of vaginal support was reviewed. The role for hysterectomy and consideration of deferral hysterectomy for each of these procedures was also discussed.  We discussed the risks, benefits, goals and alternatives to surgical approaches for pelvic floor disorders  including, but not limited to, bleeding, infection, injury to adjacent structures (including but not limited  to gastrointestinal tract, urinary tract and nerves) recurrent prolapse, recurrent stress incontinence, de  tyrell OAB, pain, sexual dysfunction, voiding dysfunction, long-term catheterization and re-operation.  Post-op  restrictions and expectations reviewed.      PROLAPSE REPAIRs:  Anatomy of apical, anterior and posterior repairs was reviewed.  Anterior colporrhaphy, midline and lateral  Posterior repair and related role for Perineorrhaphy  Enterocelerepair and apical support procedures of uterosacral and sacrospinous supension for apex support.    Role for augmented repairs with mesh of biologic and polypropylene/manufactured products  via abdominal , vaginal and laparoscopic approaches.    MID-URETHRAL SLINGS: TVT, TOT, Single Incision, Mesh material for sling was compared and contrasted to mesh for POP repair and issues related to FDA mesh warnings were discussed.    USE OF MESH FOR REINFORCEMENT OF VAGINAL REPAIRS- with autologous, polypropylene, xenografts etc.    SACROCOLPOPEXY: Abdominal, Robotic, LSC, Vaginal    COLPOCLEISIS / COLPECTOMY, partial    Roles for Urethral injectables and Urethropexy and Mid-Urethral Sling was discussed.  Roles for BOTOX, INTERSTIM and Urgent-PC (Percutaneous. Tibial Nerve Stim ) were discussed.  ROLE of CYSTOURETHROSCOPY with HYDRODISTENSION; nature of findings from this evaluation were discussed.  Exam findings and relation to support procedures.    Surgical decision-making regarding colpocleisis/colpectomy versus reconstruction of vaginal support was reviewed.  Stress urinary incontinence treatment with urethral injectables, urethropexy, pubo-vaginal, and midurethral slings were discussed.  LUH treatments were compared and contrasted to the  procedures for OAB/UUI including BOTOX, INTERSTIM and Urgent-PC (Percutaneous . Tibial Nerve Stim ) .    LIMITs of prolapse repair for correction of sustained durge loss and anal control symptoms were reviewed.  It is not a reliable treatment for her UUI or anal control symptoms for this patient.  Support can provide comfort, and a sling is reliable for LUH.    RESULTS of URODYNAMICS were reviewed.    Prolapse repair may help bladder and anal  control. It is felt that at this time it is not necessarily reliable for the patient.  Further evaluation is planned.  ROLE for pessary to assess symptoms responses was discussed.    ------  SURGICAL decision-,making was reviewed.    We discussed the risks, benefits, goals and alternatives to surgical approaches for pelvic floor disorders including, but not limited to, bleeding, infection, injury to adjacent structures (including but not limited to gastrointestinal tract, urinary tract and nerves) recurrent prolapse, recurrent stress incontinence, de tyrell OAB, pain, sexual dysfunction, voiding dysfunction, long-term catheterization and re-operation. Post-op restrictions and expectations reviewed.    REPAIR: Anatomy of apical, anterior and posterior repairs was reviewed.    Anterior- role for midline and paravaginal repairs  Posterior- anatomy of repair and relation to perineal repair  Enterocele- anatomic aspects of apex descent repairs; also explained anatomy of enterocele in presence of optimal apical support.  Perineorrhaphy  Uterosacral and sacrospinous suspension for apex support  Role for augmented repairs with mesh of biologic and polypropylene/manufactured products  via abdominal , vaginal and laparoscopic approaches.  USE OF MESH FOR REINFORCEMENT OF VAGINAL REPAIRS  SACROCOLPOPEXY: Abdominal, Robotic, LSC, Vaginal  COLPOCLEISIS / COLPECTOMY, partial  MID-URETHRAL SLINGS: TVT/retropubic vs TOT, and Single incision midurethral slings were compared and contrasted  Mesh material for sling was compared and contrasted to mesh for POP repair and issues related to FDA mesh warnings were discussed.  The role of midurethral sling or other anti-incontinence procedure use even in the absence of objective stress incontinence was discussed.  Anatomic location of sling was reviewed and demonstrated on physical exam.      DIAGNOSIS  VAGINAL VAULT PROLAPSE : complete descent of apex through normal introitus/genital  hiatus  with secondary cystocele.  NO significant rectocele    STRESS INCONTINENCE: objectively noted with POP reduced w/ CST at 100 ml on URODYNAMICS 7/27/2022    OVERACTIVE BLADDER w/ URGE INCONTINENCE:  OAB meds and PT were not beneficial ;  URGENT PC PTNS done 7/2020 - 9/2020: 11 treatment sessions; no subjective benefit      POSTMENOPAUSAL ATROPHIC VAGINITIS  -------------  Patient has history of PESSARY use ending 5/2020.  She replaced the pessary in 9/2020 as prolapse symptoms were bothersome.  Early 2021: ceased pessary use.  PESSARY fitting at last visit, 7/14/22: NO benefit to RINg or GELLHORN pessary        N81.5 Vaginal enterocele  N95.2 Postmenopausal atrophic vaginitis  N39.41 Urge incontinence  R35.0 Frequency of micturition  N81.10 Cystocele, unspecified  N39.3 Stress incontinence (female) : Noted with POP-reduced at 100 ml volume; UDS 7/2022       EVALUATION & TREATMENT PLAN   EVALUATION:  Post-urination Bladder volume / PVR (ml): 40  Urinalysis- straight catheter specimen, automated dipstick : normal for all 10 parameters  +------------------------------------------------------------------------+  ONGOING:  estradiol (estrogen) vaginal cream 1 gram dose , 2 doses per week  -----  PREANESTHESIA MEDICAL EXAM upcoming    Pre-Surgical PAIN MED PLAN:  Beginning 3 days before your procedure:  take these medications by mouth:  1) Tylenol EXTRA STRENGTH: 500 mg; Take 2 tablets by mouth every 6 hours  (NO Rx needed)  2) CELEBREX 200 mg ;One dose 2 x per day  (eRx sent to your pharmacy)  3) LYRICA (pregabalin)50 mg ; one dose 2 x per day for 3 days; additional tablets are for use after surgery. (Printed, signed Rx provided)      TREATMENT PLAN, SURGICAL    OR PLAN: 9/21/2022  DAVINCI PLATFORM SACRO-COLPOPEXY , Y-MESH PLAN, , CYSTOSCOPY  MID- URETHRAL SLING, retropubic      POST OP Pain MED plan  1) TYLENOL 500 mg : 2 tablets/caps ; taken by mouth every 6 hours  2) Ibuprofen 600 mg; ONE dose taken every 6  hours  Meds are optimally dosed every 3 hours , alternating Tylenol and Ibuprofen  For pain not controlled with these meds  3) LYRICA 50 mg ; one dose 2 times a day for 7 days    4) Oxycodone 5 mg RX for 4 tablet; 1 taken up to 2 x a day as needed.  NOTE:Oxycodone may be dosed every 4-6 hours but pain management plan  is for dosing up to 2 x per day.       VISIT TIME (return patient)  25 Minutes     Maria Del Carmen Zimmerman RN,      Portions of this  note may have been created using the Dragon voice recognition system.  Errors in content may be related to improper recognition of the system.  Effort to review and correct the note has been made but irregularities may still be present.  Medication reconciliation was done but medications not prescribed by me may be inaccurate.        PAYAM FLOWER ([place]) Thursday, Sep 8, 2022, 11:30 AM       none

## 2022-10-13 PROBLEM — Z13.31 SCREENING FOR DEPRESSION: Status: RESOLVED | Noted: 2020-02-19 | Resolved: 2020-02-21

## 2023-02-07 NOTE — H&P PST ADULT - I HAVE PERSONALLY SEEN AND EXAMINED THE PATIENT. THERE HAVE NOT BEEN ANY CHANGES IN THE PATIENT'S HISTORY OR EXAM UNLESS COMMENTED BELOW
Be sure to give Barb Fabian the full 10 days of antibiotics. Encourage rest, offer increased hydration, give tylenol/ibuprofen as needed for fever/pain. Return to clinic or follow up with PCP if you worsen or fail to improve. Patient's mother verbalizes understanding and agrees with treatment plan. Statement Selected

## 2023-10-03 NOTE — REASON FOR VISIT
[Follow-Up: _____] : a [unfilled] follow-up visit [FreeTextEntry1] : bloating V-Y Plasty Text: The defect edges were debeveled with a #15 scalpel blade.  Given the location of the defect, shape of the defect and the proximity to free margins an V-Y advancement flap was deemed most appropriate.  Using a sterile surgical marker, an appropriate advancement flap was drawn incorporating the defect and placing the expected incisions within the relaxed skin tension lines where possible.    The area thus outlined was incised deep to adipose tissue with a #15 scalpel blade.  The skin margins were undermined to an appropriate distance in all directions utilizing iris scissors.

## 2023-10-12 NOTE — H&P PST ADULT - BMI (KG/M2)
Problem: Potential for Falls  Goal: Patient will remain free of falls  Description: INTERVENTIONS:  - Educate patient/family on patient safety including physical limitations  - Instruct patient to call for assistance with activity   - Consult OT/PT to assist with strengthening/mobility   - Keep Call bell within reach  - Keep bed low and locked with side rails adjusted as appropriate  - Keep care items and personal belongings within reach  - Initiate and maintain comfort rounds  - Make Fall Risk Sign visible to staff  - Apply yellow socks and bracelet for high fall risk patients  - Consider moving patient to room near nurses station  Outcome: Progressing     Problem: Prexisting or High Potential for Compromised Skin Integrity  Goal: Skin integrity is maintained or improved  Description: INTERVENTIONS:  - Identify patients at risk for skin breakdown  - Assess and monitor skin integrity  - Assess and monitor nutrition and hydration status  - Monitor labs   - Assess for incontinence   - Turn and reposition patient  - Assist with mobility/ambulation  - Relieve pressure over bony prominences  - Avoid friction and shearing  - Provide appropriate hygiene as needed including keeping skin clean and dry  - Evaluate need for skin moisturizer/barrier cream  - Collaborate with interdisciplinary team   - Patient/family teaching  - Consider wound care consult   Outcome: Progressing     Problem: PAIN - ADULT  Goal: Verbalizes/displays adequate comfort level or baseline comfort level  Description: Interventions:  - Encourage patient to monitor pain and request assistance  - Assess pain using appropriate pain scale  - Administer analgesics based on type and severity of pain and evaluate response  - Implement non-pharmacological measures as appropriate and evaluate response  - Consider cultural and social influences on pain and pain management  - Notify physician/advanced practitioner if interventions unsuccessful or patient reports new pain  Outcome: Progressing     Problem: INFECTION - ADULT  Goal: Absence or prevention of progression during hospitalization  Description: INTERVENTIONS:  - Assess and monitor for signs and symptoms of infection  - Monitor lab/diagnostic results  - Monitor all insertion sites, i.e. indwelling lines, tubes, and drains  - Monitor endotracheal if appropriate and nasal secretions for changes in amount and color  - [place] appropriate cooling/warming therapies per order  - Administer medications as ordered  - Instruct and encourage patient and family to use good hand hygiene technique  - Identify and instruct in appropriate isolation precautions for identified infection/condition  Outcome: Progressing  Goal: Absence of fever/infection during neutropenic period  Description: INTERVENTIONS:  - Monitor WBC    Outcome: Progressing     Problem: SAFETY ADULT  Goal: Patient will remain free of falls  Description: INTERVENTIONS:  - Educate patient/family on patient safety including physical limitations  - Instruct patient to call for assistance with activity   - Consult OT/PT to assist with strengthening/mobility   - Keep Call bell within reach  - Keep bed low and locked with side rails adjusted as appropriate  - Keep care items and personal belongings within reach  - Initiate and maintain comfort rounds  - Make Fall Risk Sign visible to staff  - Apply yellow socks and bracelet for high fall risk patients  - Consider moving patient to room near nurses station  Outcome: Progressing  Goal: Maintain or return to baseline ADL function  Description: INTERVENTIONS:  -  Assess patient's ability to carry out ADLs; assess patient's baseline for ADL function and identify physical deficits which impact ability to perform ADLs (bathing, care of mouth/teeth, toileting, grooming, dressing, etc.)  - Assess/evaluate cause of self-care deficits   - Assess range of motion  - Assess patient's mobility; develop plan if impaired  - Assess patient's need for assistive devices and provide as appropriate  - Encourage maximum independence but intervene and supervise when necessary  - Involve family in performance of ADLs  - Assess for home care needs following discharge   - Consider OT consult to assist with ADL evaluation and planning for discharge  - Provide patient education as appropriate  Outcome: Progressing  Goal: Maintains/Returns to pre admission functional level  Description: INTERVENTIONS:  - Perform BMAT or MOVE assessment daily.   - Set and communicate daily mobility goal to care team and patient/family/caregiver. - Collaborate with rehabilitation services on mobility goals if consulted  - Stand patient 4 times a day  - Ambulate patient 2 times a day  - Out of bed to chair 2 times a day   - Out of bed for meals 2 times a day  - Out of bed for toileting  - Record patient progress and toleration of activity level   Outcome: Progressing     Problem: DISCHARGE PLANNING  Goal: Discharge to home or other facility with appropriate resources  Description: INTERVENTIONS:  - Identify barriers to discharge w/patient and caregiver  - Arrange for needed discharge resources and transportation as appropriate  - Identify discharge learning needs (meds, wound care, etc.)  - Arrange for interpretive services to assist at discharge as needed  - Refer to Case Management Department for coordinating discharge planning if the patient needs post-hospital services based on physician/advanced practitioner order or complex needs related to functional status, cognitive ability, or social support system  Outcome: Progressing     Problem: Knowledge Deficit  Goal: Patient/family/caregiver demonstrates understanding of disease process, treatment plan, medications, and discharge instructions  Description: Complete learning assessment and assess knowledge base.   Interventions:  - Provide teaching at level of understanding  - Provide teaching via preferred learning methods  Outcome: Progressing 23.7

## 2024-03-06 NOTE — BRIEF OPERATIVE NOTE - OPERATION/FINDINGS
Anesthesia PreOp Note    HPI:     Lena Macedo is a 65 year old female who presents for preoperative consultation requested by: Ivonne Mayes MD    Date of Surgery: 3/6/2024    Procedure(s):  Excision of left vulvar lesion  Indication: Vulvar lesion    Relevant Problems   No relevant active problems       NPO:  Last Liquid Consumption Date: 03/05/24  Last Liquid Consumption Time: 2100  Last Solid Consumption Date: 03/05/24  Last Solid Consumption Time: 2100  Last Liquid Consumption Date: 03/05/24          History Review:  Patient Active Problem List    Diagnosis Date Noted    Subacute cough 02/19/2024    COVID-19 02/19/2024    Type 2 diabetes mellitus without complication, without long-term current use of insulin (HCC) 05/30/2023    Acute cough 09/21/2022    Sore throat 09/21/2022    Suspected COVID-19 virus infection 09/21/2022    Morbid obesity with BMI of 40.0-44.9, adult ([facility]) 07/18/2022    Weight gain 07/18/2022    Dyslipidemia 07/18/2022    Lower extremity edema 07/18/2022    Pulmonary hypertension (HCC) 12/26/2019    Chest pain 12/26/2019    GÉNESIS on CPAP 12/26/2019    Chronic pulmonary embolism without acute cor pulmonale ([facility]) 10/28/2018    Thyroid nodule 05/11/2018    Class 2 obesity due to excess calories without serious comorbidity with body mass index (BMI) of 39.0 to 39.9 in adult 05/07/2018    Chronic fatigue 05/07/2018    Low vitamin D level 05/07/2018    High cholesterol 05/07/2018    Hypertriglyceridemia 05/07/2018    Primary osteoarthritis 07/25/2014    Fibromyalgia 07/25/2014    Depression 07/25/2014    Generalized osteoarthrosis, involving multiple sites 04/18/2014    Anxiety state 04/07/2014    Malaise and fatigue 04/07/2014    Pain in joint 04/07/2014    Tear film insufficiency 04/07/2014    Abnormal clinical finding 09/11/2006       Past Medical History:   Diagnosis Date    Anxiety state     Arthritis     Chronic pulmonary embolism without acute cor pulmonale (HCC) 10/28/2018    Class 2  obesity due to excess calories without serious comorbidity with body mass index (BMI) of 39.0 to 39.9 in adult 05/07/2018    Depression     Disorder of thyroid     Esophageal reflux     Fibromyalgia     Hearing impairment     left ear    High blood pressure     High cholesterol     Hyperlipidemia     Hyperthyroidism     Insulin resistance     Morbid obesity with BMI of 40.0-44.9, adult (HCC)     Osteoarthritis     Pneumonia due to organism     Prediabetes     Pulmonary embolism (HCC)     Sleep apnea     Visual impairment     wears glasses       Past Surgical History:   Procedure Laterality Date    CHOLECYSTECTOMY      NEEDLE BIOPSY LEFT Left     dorian    OTHER SURGICAL HISTORY      gall stones       Medications Prior to Admission   Medication Sig Dispense Refill Last Dose    sertraline 50 MG Oral Tab Take 1.5 tablets (75 mg total) by mouth daily. 135 tablet 1 3/5/2024 at 1800    simvastatin 40 MG Oral Tab Take 1 tablet (40 mg total) by mouth nightly. 90 tablet 3 3/5/2024 at 1800    Fenofibrate 160 MG Oral Tab Take 1 tablet (160 mg total) by mouth daily. 90 tablet 3 3/5/2024 at 0800    levothyroxine (SYNTHROID) 150 MCG Oral Tab Take 1 tablet (150 mcg total) by mouth before breakfast. 90 tablet 3 3/5/2024 at 0800    pantoprazole 40 MG Oral Tab EC Take 1 tablet (40 mg total) by mouth before breakfast. 90 tablet 3 3/5/2024 at 0800    semaglutide (OZEMPIC, 0.25 OR 0.5 MG/DOSE,) 2 MG/3ML Subcutaneous Solution Pen-injector Inject 0.5 mg into the skin once a week. (Patient taking differently: Inject 0.5 mg into the skin once a week. Do not take for 7 days prior to your procedure on 3/6/24) 3 mL 1 2/15/2024    propranolol 40 MG Oral Tab Take 1 tablet (40 mg total) by mouth 2 (two) times daily. 180 tablet 1 3/4/2024    albuterol 108 (90 Base) MCG/ACT Inhalation Aero Soln Inhale 2 puffs into the lungs every 4 (four) hours as needed for Wheezing or Shortness of Breath. 6.7 each 1 3/6/2024 at 0700    loratadine 10 MG Oral Tab  Take 1 tablet (10 mg total) by mouth daily. 90 tablet 3 3/5/2024 at 0800    aspirin 81 MG Oral Tab Take 1 tablet (81 mg total) by mouth daily.   2/28/2024    QUEtiapine ER 50 MG Oral Tablet 24 Hr Take 1 tablet (50 mg total) by mouth nightly. 90 tablet 1     alendronate 70 MG Oral Tab  (Patient not taking: Reported on 3/1/2024)   Not Taking    oxybutynin 5 MG Oral Tab  (Patient not taking: Reported on 3/1/2024)   Not Taking    topiramate 25 MG Oral Tab Take 1 tablet (25 mg total) by mouth every evening. (Patient not taking: Reported on 3/1/2024)   Not Taking    Solifenacin Succinate 5 MG Oral Tab Take 1 tablet (5 mg total) by mouth daily. (Patient not taking: Reported on 3/1/2024)   Not Taking    Meloxicam 7.5 MG Oral Tab Take 1 tablet (7.5 mg total) by mouth daily. (Patient not taking: Reported on 3/1/2024)   Not Taking    hydroCHLOROthiazide 12.5 MG Oral Cap Take 1 capsule (12.5 mg total) by mouth daily.       Phentermine HCl (LOMAIRA) 8 MG Oral Tab Take 1 tablet by mouth daily for 90 doses. (Patient not taking: Reported on 3/1/2024) 90 tablet 0 Not Taking    fluticasone propionate 50 MCG/ACT Nasal Suspension 2 sprays by Nasal route daily. (Patient not taking: Reported on 3/1/2024) 16 mL 1 Not Taking    Spacer/Aero-Holding Chambers Does not apply Device Use with inhaler as needed 1 each 0     ERGOCALCIFEROL 1.25 MG (16338 UT) Oral Cap TOME GINNY CAPSULA POR VIA ORAL GINNY VES POR SEMANA (Patient not taking: Reported on 3/1/2024) 12 capsule 3 Not Taking    Nebulizer Does not apply Misc by Does not apply route.        Current Facility-Administered Medications Ordered in Epic   Medication Dose Route Frequency Provider Last Rate Last Admin    lactated ringers infusion   Intravenous Continuous Ivonne Mayes MD        acetaminophen (Tylenol Extra Strength) tab 1,000 mg  1,000 mg Oral Once Ivonne Mayes MD        metoprolol tartrate (Lopressor) tab 25 mg  25 mg Oral Once PRN Ivonne Mayes MD        famotidine  (Pepcid) tab 20 mg  20 mg Oral Once Ivonne Mayes MD        Or    famotidine (Pepcid) 20 mg/2mL injection 20 mg  20 mg Intravenous Once Ivonne Mayes MD        metoclopramide (Reglan) tab 10 mg  10 mg Oral Once Ivonne Mayes MD        Or    metoclopramide (Reglan) 5 mg/mL injection 10 mg  10 mg Intravenous Once Ivonne Mayes MD         No current [facility]-ordered outpatient medications on file.       No Known Allergies    Family History   Problem Relation Age of Onset    Stroke Mother         CVA    Diabetes Mother     Hypertension Father     Neurological Disorder Father         parkinson's disesae    Arthritis Father     Lipids Father         hyperlipidemia    Cancer Neg     Heart Disease Neg         CAD    Breast Cancer Neg     Ovarian Cancer Neg      Social History     Socioeconomic History    Marital status:    Tobacco Use    Smoking status: Never    Smokeless tobacco: Never   Vaping Use    Vaping Use: Never used   Substance and Sexual Activity    Alcohol use: No    Drug use: No   Other Topics Concern    Caffeine Concern No       Available pre-op labs reviewed.             Vital Signs:  Body mass index is 39.68 kg/m².   height is 1.549 m (5' 1\") and weight is 95.3 kg (210 lb).   Vitals:    03/01/24 1241   Weight: 95.3 kg (210 lb)   Height: 1.549 m (5' 1\")        Anesthesia Evaluation     Patient summary reviewed and Nursing notes reviewed    No history of anesthetic complications   Airway   Mallampati: III  TM distance: >3 FB  Neck ROM: full  Dental      Pulmonary     breath sounds clear to auscultation  (+) sleep apnea on CPAP  (-) COPD, asthma  Cardiovascular   (+) hypertension  (-) CAD, CHF    Rhythm: regular  Rate: normal    Neuro/Psych    (+)  anxiety/panic attacks,  depression    (-) CVA    GI/Hepatic/Renal    (+) GERD  (-) liver disease, renal disease    Endo/Other    (+) diabetes mellitus type 2  (-) hypothyroidism  Abdominal   (+) obese                 Anesthesia Plan:   ASA:   3  Plan:   General  Airway:  LMA  Post-op Pain Management: IV analgesics and Oral pain medication  Informed Consent Plan and Risks Discussed With:  Patient      I have informed Lena Macedo and/or legal guardian or family member of the nature of the anesthetic plan, benefits, risks including possible dental damage if relevant, major complications, and any alternative forms of anesthetic management.   All of the patient's questions were answered to the best of my ability. The patient desires the anesthetic management as planned.  Colton Gilman MD  3/6/2024 8:56 AM  Present on Admission:  **None**       Laparoscopic cholecystectomy performed in standard fashion: both cystic duct and artery were identified and ligated with 5mm hemolocks. A large lipoma-appearing structure was found in the gallbladder fossa upon dissection of the gallbladder off the liver bed. Part of this was resected with the specimen. A large stone was palpable in gallbladder consistent with ultrasound findings. The fascia was opened to extract the specimen, and was closed with interrupted figure of 8 0vicryl sutures. The skin was closed with 4-0 monocryl. The patient tolerated the procedure well and was extubated in the OR in stable condition.